# Patient Record
Sex: MALE | Race: WHITE | Employment: FULL TIME | URBAN - METROPOLITAN AREA
[De-identification: names, ages, dates, MRNs, and addresses within clinical notes are randomized per-mention and may not be internally consistent; named-entity substitution may affect disease eponyms.]

---

## 2017-09-25 ENCOUNTER — HOSPITAL ENCOUNTER (EMERGENCY)
Age: 52
Discharge: HOME | End: 2017-09-25
Attending: EMERGENCY MEDICINE

## 2017-09-25 ENCOUNTER — APPOINTMENT (OUTPATIENT)
Dept: GENERAL RADIOLOGY | Age: 52
End: 2017-09-25

## 2017-09-25 ENCOUNTER — APPOINTMENT (OUTPATIENT)
Dept: CT IMAGING | Age: 52
End: 2017-09-25

## 2017-09-25 ENCOUNTER — APPOINTMENT (OUTPATIENT)
Dept: ULTRASOUND IMAGING | Age: 52
End: 2017-09-25

## 2017-09-25 VITALS
TEMPERATURE: 98 F | RESPIRATION RATE: 16 BRPM | WEIGHT: 240 LBS | HEIGHT: 74 IN | OXYGEN SATURATION: 98 % | SYSTOLIC BLOOD PRESSURE: 145 MMHG | HEART RATE: 60 BPM | DIASTOLIC BLOOD PRESSURE: 96 MMHG | BODY MASS INDEX: 30.8 KG/M2

## 2017-09-25 DIAGNOSIS — S61.211A LACERATION OF LEFT INDEX FINGER: ICD-10-CM

## 2017-09-25 DIAGNOSIS — R52 PAIN: ICD-10-CM

## 2017-09-25 DIAGNOSIS — K40.90 UNILATERAL INGUINAL HERNIA WITHOUT OBSTRUCTION OR GANGRENE, RECURRENCE NOT SPECIFIED: ICD-10-CM

## 2017-09-25 DIAGNOSIS — S66.802A INJURY OF FLEXOR TENDON OF HAND, LEFT, INITIAL ENCOUNTER: Primary | ICD-10-CM

## 2017-09-25 LAB
ABSOLUTE EOS #: 0.1 K/UL (ref 0–0.4)
ABSOLUTE LYMPH #: 1.5 K/UL (ref 1–4.8)
ABSOLUTE MONO #: 0.6 K/UL (ref 0.1–1.3)
ALBUMIN SERPL-MCNC: 4.3 G/DL (ref 3.5–5.2)
ALBUMIN/GLOBULIN RATIO: ABNORMAL (ref 1–2.5)
ALP BLD-CCNC: 56 U/L (ref 40–129)
ALT SERPL-CCNC: 23 U/L (ref 5–41)
ANION GAP SERPL CALCULATED.3IONS-SCNC: 14 MMOL/L (ref 9–17)
AST SERPL-CCNC: 18 U/L
BASOPHILS # BLD: 1 %
BASOPHILS ABSOLUTE: 0 K/UL (ref 0–0.2)
BILIRUB SERPL-MCNC: 0.41 MG/DL (ref 0.3–1.2)
BILIRUBIN URINE: NEGATIVE
BUN BLDV-MCNC: 21 MG/DL (ref 6–20)
BUN/CREAT BLD: ABNORMAL (ref 9–20)
CALCIUM SERPL-MCNC: 8.6 MG/DL (ref 8.6–10.4)
CHLORIDE BLD-SCNC: 101 MMOL/L (ref 98–107)
CO2: 22 MMOL/L (ref 20–31)
COLOR: YELLOW
COMMENT UA: NORMAL
CREAT SERPL-MCNC: 0.79 MG/DL (ref 0.7–1.2)
DIFFERENTIAL TYPE: NORMAL
EOSINOPHILS RELATIVE PERCENT: 2 %
GFR AFRICAN AMERICAN: >60 ML/MIN
GFR NON-AFRICAN AMERICAN: >60 ML/MIN
GFR SERPL CREATININE-BSD FRML MDRD: ABNORMAL ML/MIN/{1.73_M2}
GFR SERPL CREATININE-BSD FRML MDRD: ABNORMAL ML/MIN/{1.73_M2}
GLUCOSE BLD-MCNC: 107 MG/DL (ref 70–99)
GLUCOSE URINE: NEGATIVE
HCT VFR BLD CALC: 44.1 % (ref 41–53)
HEMOGLOBIN: 15.7 G/DL (ref 13.5–17.5)
KETONES, URINE: NEGATIVE
LEUKOCYTE ESTERASE, URINE: NEGATIVE
LIPASE: 40 U/L (ref 13–60)
LYMPHOCYTES # BLD: 19 %
MCH RBC QN AUTO: 33.6 PG (ref 26–34)
MCHC RBC AUTO-ENTMCNC: 35.6 G/DL (ref 31–37)
MCV RBC AUTO: 94.4 FL (ref 80–100)
MONOCYTES # BLD: 8 %
NITRITE, URINE: NEGATIVE
PDW BLD-RTO: 12.4 % (ref 11.5–14.9)
PH UA: 5.5 (ref 5–8)
PLATELET # BLD: 191 K/UL (ref 150–450)
PLATELET ESTIMATE: NORMAL
PMV BLD AUTO: 7.5 FL (ref 6–12)
POTASSIUM SERPL-SCNC: 4.2 MMOL/L (ref 3.7–5.3)
PROTEIN UA: NEGATIVE
RBC # BLD: 4.67 M/UL (ref 4.5–5.9)
RBC # BLD: NORMAL 10*6/UL
SEG NEUTROPHILS: 70 %
SEGMENTED NEUTROPHILS ABSOLUTE COUNT: 5.8 K/UL (ref 1.3–9.1)
SODIUM BLD-SCNC: 137 MMOL/L (ref 135–144)
SPECIFIC GRAVITY UA: 1.02 (ref 1–1.03)
TOTAL PROTEIN: 6.9 G/DL (ref 6.4–8.3)
TURBIDITY: CLEAR
URINE HGB: NEGATIVE
UROBILINOGEN, URINE: NORMAL
WBC # BLD: 8.1 K/UL (ref 3.5–11)
WBC # BLD: NORMAL 10*3/UL

## 2017-09-25 PROCEDURE — 6360000002 HC RX W HCPCS: Performed by: EMERGENCY MEDICINE

## 2017-09-25 PROCEDURE — 90471 IMMUNIZATION ADMIN: CPT | Performed by: EMERGENCY MEDICINE

## 2017-09-25 PROCEDURE — 83690 ASSAY OF LIPASE: CPT

## 2017-09-25 PROCEDURE — 2580000003 HC RX 258: Performed by: EMERGENCY MEDICINE

## 2017-09-25 PROCEDURE — 73130 X-RAY EXAM OF HAND: CPT

## 2017-09-25 PROCEDURE — 85025 COMPLETE CBC W/AUTO DIFF WBC: CPT

## 2017-09-25 PROCEDURE — 80053 COMPREHEN METABOLIC PANEL: CPT

## 2017-09-25 PROCEDURE — 93976 VASCULAR STUDY: CPT

## 2017-09-25 PROCEDURE — 36415 COLL VENOUS BLD VENIPUNCTURE: CPT

## 2017-09-25 PROCEDURE — 74177 CT ABD & PELVIS W/CONTRAST: CPT

## 2017-09-25 PROCEDURE — 90715 TDAP VACCINE 7 YRS/> IM: CPT | Performed by: EMERGENCY MEDICINE

## 2017-09-25 PROCEDURE — 6360000004 HC RX CONTRAST MEDICATION: Performed by: EMERGENCY MEDICINE

## 2017-09-25 PROCEDURE — 99284 EMERGENCY DEPT VISIT MOD MDM: CPT

## 2017-09-25 PROCEDURE — 76870 US EXAM SCROTUM: CPT

## 2017-09-25 PROCEDURE — 81003 URINALYSIS AUTO W/O SCOPE: CPT

## 2017-09-25 RX ORDER — HYDROCODONE BITARTRATE AND ACETAMINOPHEN 5; 325 MG/1; MG/1
1 TABLET ORAL EVERY 6 HOURS PRN
Qty: 15 TABLET | Refills: 0 | Status: SHIPPED | OUTPATIENT
Start: 2017-09-25 | End: 2017-10-02

## 2017-09-25 RX ORDER — SODIUM CHLORIDE 0.9 % (FLUSH) 0.9 %
10 SYRINGE (ML) INJECTION PRN
Status: DISCONTINUED | OUTPATIENT
Start: 2017-09-25 | End: 2017-09-25 | Stop reason: HOSPADM

## 2017-09-25 RX ORDER — 0.9 % SODIUM CHLORIDE 0.9 %
100 INTRAVENOUS SOLUTION INTRAVENOUS ONCE
Status: COMPLETED | OUTPATIENT
Start: 2017-09-25 | End: 2017-09-25

## 2017-09-25 RX ADMIN — Medication 10 ML: at 09:08

## 2017-09-25 RX ADMIN — IOPAMIDOL 100 ML: 755 INJECTION, SOLUTION INTRAVENOUS at 09:08

## 2017-09-25 RX ADMIN — TETANUS TOXOID, REDUCED DIPHTHERIA TOXOID AND ACELLULAR PERTUSSIS VACCINE, ADSORBED 0.5 ML: 5; 2.5; 8; 8; 2.5 SUSPENSION INTRAMUSCULAR at 08:08

## 2017-09-25 RX ADMIN — SODIUM CHLORIDE 100 ML: 9 INJECTION, SOLUTION INTRAVENOUS at 09:08

## 2017-09-25 ASSESSMENT — PAIN DESCRIPTION - ORIENTATION: ORIENTATION: RIGHT

## 2017-09-25 ASSESSMENT — PAIN DESCRIPTION - DESCRIPTORS: DESCRIPTORS: SHARP

## 2017-09-25 ASSESSMENT — PAIN DESCRIPTION - PAIN TYPE: TYPE: ACUTE PAIN

## 2017-09-25 ASSESSMENT — PAIN DESCRIPTION - LOCATION: LOCATION: GROIN

## 2017-09-25 ASSESSMENT — PAIN SCALES - GENERAL: PAINLEVEL_OUTOF10: 7

## 2018-11-15 ENCOUNTER — OFFICE VISIT (OUTPATIENT)
Dept: FAMILY MEDICINE CLINIC | Age: 53
End: 2018-11-15
Payer: COMMERCIAL

## 2018-11-15 VITALS
DIASTOLIC BLOOD PRESSURE: 90 MMHG | WEIGHT: 240.4 LBS | SYSTOLIC BLOOD PRESSURE: 142 MMHG | BODY MASS INDEX: 30.85 KG/M2 | TEMPERATURE: 97.1 F | HEIGHT: 74 IN | HEART RATE: 67 BPM

## 2018-11-15 DIAGNOSIS — Z23 NEED FOR INFLUENZA VACCINATION: ICD-10-CM

## 2018-11-15 DIAGNOSIS — Z13.220 SCREENING FOR HYPERLIPIDEMIA: ICD-10-CM

## 2018-11-15 DIAGNOSIS — Z12.5 SCREENING FOR PROSTATE CANCER: ICD-10-CM

## 2018-11-15 DIAGNOSIS — K40.90 INGUINAL HERNIA, RIGHT: ICD-10-CM

## 2018-11-15 DIAGNOSIS — E66.09 CLASS 1 OBESITY DUE TO EXCESS CALORIES WITH SERIOUS COMORBIDITY AND BODY MASS INDEX (BMI) OF 30.0 TO 30.9 IN ADULT: ICD-10-CM

## 2018-11-15 DIAGNOSIS — Z76.89 ENCOUNTER TO ESTABLISH CARE: ICD-10-CM

## 2018-11-15 DIAGNOSIS — Z80.0 FAMILY HISTORY OF COLON CANCER: ICD-10-CM

## 2018-11-15 DIAGNOSIS — Z12.11 SCREENING FOR COLON CANCER: ICD-10-CM

## 2018-11-15 DIAGNOSIS — I10 ESSENTIAL HYPERTENSION: Primary | ICD-10-CM

## 2018-11-15 PROBLEM — E66.811 CLASS 1 OBESITY DUE TO EXCESS CALORIES WITH SERIOUS COMORBIDITY AND BODY MASS INDEX (BMI) OF 30.0 TO 30.9 IN ADULT: Status: ACTIVE | Noted: 2018-11-15

## 2018-11-15 PROCEDURE — 4004F PT TOBACCO SCREEN RCVD TLK: CPT | Performed by: NURSE PRACTITIONER

## 2018-11-15 PROCEDURE — 3017F COLORECTAL CA SCREEN DOC REV: CPT | Performed by: NURSE PRACTITIONER

## 2018-11-15 PROCEDURE — 90688 IIV4 VACCINE SPLT 0.5 ML IM: CPT | Performed by: NURSE PRACTITIONER

## 2018-11-15 PROCEDURE — 90471 IMMUNIZATION ADMIN: CPT | Performed by: NURSE PRACTITIONER

## 2018-11-15 PROCEDURE — G8482 FLU IMMUNIZE ORDER/ADMIN: HCPCS | Performed by: NURSE PRACTITIONER

## 2018-11-15 PROCEDURE — G8427 DOCREV CUR MEDS BY ELIG CLIN: HCPCS | Performed by: NURSE PRACTITIONER

## 2018-11-15 PROCEDURE — G8417 CALC BMI ABV UP PARAM F/U: HCPCS | Performed by: NURSE PRACTITIONER

## 2018-11-15 PROCEDURE — 99203 OFFICE O/P NEW LOW 30 MIN: CPT | Performed by: NURSE PRACTITIONER

## 2018-11-15 RX ORDER — HYDROCHLOROTHIAZIDE 12.5 MG/1
12.5 TABLET ORAL DAILY
Qty: 90 TABLET | Refills: 0 | Status: SHIPPED | OUTPATIENT
Start: 2018-11-15 | End: 2019-01-31 | Stop reason: SDUPTHER

## 2018-11-15 ASSESSMENT — PATIENT HEALTH QUESTIONNAIRE - PHQ9
2. FEELING DOWN, DEPRESSED OR HOPELESS: 0
SUM OF ALL RESPONSES TO PHQ9 QUESTIONS 1 & 2: 0
SUM OF ALL RESPONSES TO PHQ QUESTIONS 1-9: 0
SUM OF ALL RESPONSES TO PHQ QUESTIONS 1-9: 0
1. LITTLE INTEREST OR PLEASURE IN DOING THINGS: 0

## 2018-11-15 NOTE — PROGRESS NOTES
heard.  Pulmonary/Chest: Effort normal and breath sounds normal. No respiratory distress. Musculoskeletal: Normal range of motion. Neurological: He is alert and oriented to person, place, and time. Skin: Skin is warm and dry. Psychiatric: He has a normal mood and affect. His behavior is normal. Judgment and thought content normal.   Vitals reviewed. Assessment:      1. Essential hypertension    2. Class 1 obesity due to excess calories with serious comorbidity and body mass index (BMI) of 30.0 to 30.9 in adult    3. Screening for prostate cancer    4. Encounter to establish care    5. Screening for hyperlipidemia    6. Inguinal hernia, right    7. Screening for colon cancer    8. Family history of colon cancer    9. Need for influenza vaccination                     Plan:      Orders Placed This Encounter   Procedures    INFLUENZA, QUADV, 3 YRS AND OLDER, IM, MDV, 0.5ML (FLUZONE QUADV)    CBC With Auto Differential     Standing Status:   Future     Standing Expiration Date:   11/15/2019    Comprehensive Metabolic Panel     Standing Status:   Future     Standing Expiration Date:   11/15/2019    Lipid Panel     Standing Status:   Future     Standing Expiration Date:   11/15/2019     Order Specific Question:   Is Patient Fasting?/# of Hours     Answer:   15    PSA Screening     Standing Status:   Future     Standing Expiration Date:   11/15/2019   Zeke Parker MD, Gastroeneterology [place]     Referral Priority:   Routine     Referral Type:   Consult for Advice and Opinion     Referral Reason:   Specialty Services Required     Referred to Provider:   Suze Johnson MD     Requested Specialty:   Gastroenterology     Number of Visits Requested:   1     1. Essential hypertension    - CBC With Auto Differential; Future  - Comprehensive Metabolic Panel; Future  - aspirin 81 MG tablet; Take 81 mg by mouth daily  - hydrochlorothiazide (HYDRODIURIL) 12.5 MG tablet;  Take 1 tablet by mouth daily

## 2018-11-16 ASSESSMENT — ENCOUNTER SYMPTOMS
COUGH: 0
SHORTNESS OF BREATH: 0
RESPIRATORY NEGATIVE: 1
WHEEZING: 0

## 2019-01-22 ENCOUNTER — HOSPITAL ENCOUNTER (OUTPATIENT)
Age: 54
Setting detail: SPECIMEN
Discharge: HOME | End: 2019-01-22
Payer: COMMERCIAL

## 2019-01-22 ENCOUNTER — NURSE ONLY (OUTPATIENT)
Dept: FAMILY MEDICINE CLINIC | Age: 54
End: 2019-01-22
Payer: COMMERCIAL

## 2019-01-22 VITALS — SYSTOLIC BLOOD PRESSURE: 136 MMHG | DIASTOLIC BLOOD PRESSURE: 86 MMHG

## 2019-01-22 DIAGNOSIS — I10 ESSENTIAL HYPERTENSION: ICD-10-CM

## 2019-01-22 DIAGNOSIS — Z12.5 SCREENING FOR PROSTATE CANCER: ICD-10-CM

## 2019-01-22 DIAGNOSIS — E66.09 CLASS 1 OBESITY DUE TO EXCESS CALORIES WITH SERIOUS COMORBIDITY AND BODY MASS INDEX (BMI) OF 30.0 TO 30.9 IN ADULT: ICD-10-CM

## 2019-01-22 DIAGNOSIS — Z13.220 SCREENING FOR HYPERLIPIDEMIA: ICD-10-CM

## 2019-01-22 LAB
ABSOLUTE EOS #: 0.1 K/UL (ref 0–0.44)
ABSOLUTE IMMATURE GRANULOCYTE: 0.03 K/UL (ref 0–0.3)
ABSOLUTE LYMPH #: 1.99 K/UL (ref 1.1–3.7)
ABSOLUTE MONO #: 0.68 K/UL (ref 0.1–1.2)
ALBUMIN SERPL-MCNC: 4.7 G/DL (ref 3.5–5.2)
ALBUMIN/GLOBULIN RATIO: 1.7 (ref 1–2.5)
ALP BLD-CCNC: 47 U/L (ref 40–129)
ALT SERPL-CCNC: 31 U/L (ref 5–41)
ANION GAP SERPL CALCULATED.3IONS-SCNC: 14 MMOL/L (ref 9–17)
AST SERPL-CCNC: 21 U/L
BASOPHILS # BLD: 1 % (ref 0–2)
BASOPHILS ABSOLUTE: 0.06 K/UL (ref 0–0.2)
BILIRUB SERPL-MCNC: 0.64 MG/DL (ref 0.3–1.2)
BUN BLDV-MCNC: 23 MG/DL (ref 6–20)
BUN/CREAT BLD: ABNORMAL (ref 9–20)
CALCIUM SERPL-MCNC: 9.7 MG/DL (ref 8.6–10.4)
CHLORIDE BLD-SCNC: 97 MMOL/L (ref 98–107)
CHOLESTEROL/HDL RATIO: 4.1
CHOLESTEROL: 193 MG/DL
CO2: 27 MMOL/L (ref 20–31)
CREAT SERPL-MCNC: 0.88 MG/DL (ref 0.7–1.2)
DIFFERENTIAL TYPE: ABNORMAL
EOSINOPHILS RELATIVE PERCENT: 1 % (ref 1–4)
GFR AFRICAN AMERICAN: >60 ML/MIN
GFR NON-AFRICAN AMERICAN: >60 ML/MIN
GFR SERPL CREATININE-BSD FRML MDRD: ABNORMAL ML/MIN/{1.73_M2}
GFR SERPL CREATININE-BSD FRML MDRD: ABNORMAL ML/MIN/{1.73_M2}
GLUCOSE BLD-MCNC: 100 MG/DL (ref 70–99)
HCT VFR BLD CALC: 45.6 % (ref 40.7–50.3)
HDLC SERPL-MCNC: 47 MG/DL
HEMOGLOBIN: 16.1 G/DL (ref 13–17)
IMMATURE GRANULOCYTES: 0 %
LDL CHOLESTEROL: 116 MG/DL (ref 0–130)
LYMPHOCYTES # BLD: 21 % (ref 24–43)
MCH RBC QN AUTO: 32.7 PG (ref 25.2–33.5)
MCHC RBC AUTO-ENTMCNC: 35.3 G/DL (ref 28.4–34.8)
MCV RBC AUTO: 92.5 FL (ref 82.6–102.9)
MONOCYTES # BLD: 7 % (ref 3–12)
NRBC AUTOMATED: 0 PER 100 WBC
PDW BLD-RTO: 11.9 % (ref 11.8–14.4)
PLATELET # BLD: 217 K/UL (ref 138–453)
PLATELET ESTIMATE: ABNORMAL
PMV BLD AUTO: 9.6 FL (ref 8.1–13.5)
POTASSIUM SERPL-SCNC: 4.5 MMOL/L (ref 3.7–5.3)
PROSTATE SPECIFIC ANTIGEN: 1.03 UG/L
RBC # BLD: 4.93 M/UL (ref 4.21–5.77)
RBC # BLD: ABNORMAL 10*6/UL
SEG NEUTROPHILS: 70 % (ref 36–65)
SEGMENTED NEUTROPHILS ABSOLUTE COUNT: 6.73 K/UL (ref 1.5–8.1)
SODIUM BLD-SCNC: 138 MMOL/L (ref 135–144)
TOTAL PROTEIN: 7.4 G/DL (ref 6.4–8.3)
TRIGL SERPL-MCNC: 150 MG/DL
VLDLC SERPL CALC-MCNC: ABNORMAL MG/DL (ref 1–30)
WBC # BLD: 9.6 K/UL (ref 3.5–11.3)
WBC # BLD: ABNORMAL 10*3/UL

## 2019-01-22 PROCEDURE — 99211 OFF/OP EST MAY X REQ PHY/QHP: CPT | Performed by: NURSE PRACTITIONER

## 2019-01-23 ENCOUNTER — TELEPHONE (OUTPATIENT)
Dept: GASTROENTEROLOGY | Age: 54
End: 2019-01-23

## 2019-01-24 ENCOUNTER — TELEPHONE (OUTPATIENT)
Dept: GASTROENTEROLOGY | Age: 54
End: 2019-01-24

## 2019-01-24 DIAGNOSIS — Z12.11 SCREENING FOR COLON CANCER: Primary | ICD-10-CM

## 2019-01-25 RX ORDER — POLYETHYLENE GLYCOL 3350 17 G/17G
POWDER, FOR SOLUTION ORAL
Qty: 255 G | Refills: 0 | Status: ON HOLD | OUTPATIENT
Start: 2019-01-25 | End: 2019-01-29 | Stop reason: ALTCHOICE

## 2019-01-29 ENCOUNTER — HOSPITAL ENCOUNTER (OUTPATIENT)
Age: 54
Setting detail: OUTPATIENT SURGERY
Discharge: HOME | End: 2019-01-29
Attending: INTERNAL MEDICINE | Admitting: INTERNAL MEDICINE
Payer: COMMERCIAL

## 2019-01-29 VITALS
RESPIRATION RATE: 16 BRPM | TEMPERATURE: 97.5 F | HEART RATE: 70 BPM | BODY MASS INDEX: 29.52 KG/M2 | HEIGHT: 74 IN | OXYGEN SATURATION: 98 % | SYSTOLIC BLOOD PRESSURE: 140 MMHG | DIASTOLIC BLOOD PRESSURE: 78 MMHG | WEIGHT: 230 LBS

## 2019-01-29 PROCEDURE — 2580000003 HC RX 258: Performed by: INTERNAL MEDICINE

## 2019-01-29 PROCEDURE — 99153 MOD SED SAME PHYS/QHP EA: CPT | Performed by: INTERNAL MEDICINE

## 2019-01-29 PROCEDURE — 7100000011 HC PHASE II RECOVERY - ADDTL 15 MIN: Performed by: INTERNAL MEDICINE

## 2019-01-29 PROCEDURE — 3609010300 HC COLONOSCOPY W/BIOPSY SINGLE/MULTIPLE: Performed by: INTERNAL MEDICINE

## 2019-01-29 PROCEDURE — 2709999900 HC NON-CHARGEABLE SUPPLY: Performed by: INTERNAL MEDICINE

## 2019-01-29 PROCEDURE — 88305 TISSUE EXAM BY PATHOLOGIST: CPT

## 2019-01-29 PROCEDURE — 6360000002 HC RX W HCPCS: Performed by: INTERNAL MEDICINE

## 2019-01-29 PROCEDURE — 7100000010 HC PHASE II RECOVERY - FIRST 15 MIN: Performed by: INTERNAL MEDICINE

## 2019-01-29 PROCEDURE — 99152 MOD SED SAME PHYS/QHP 5/>YRS: CPT | Performed by: INTERNAL MEDICINE

## 2019-01-29 RX ORDER — FENTANYL CITRATE 50 UG/ML
INJECTION, SOLUTION INTRAMUSCULAR; INTRAVENOUS PRN
Status: DISCONTINUED | OUTPATIENT
Start: 2019-01-29 | End: 2019-01-29 | Stop reason: HOSPADM

## 2019-01-29 RX ORDER — SODIUM CHLORIDE 9 MG/ML
INJECTION, SOLUTION INTRAVENOUS CONTINUOUS
Status: DISCONTINUED | OUTPATIENT
Start: 2019-01-29 | End: 2019-01-29 | Stop reason: HOSPADM

## 2019-01-29 RX ORDER — MIDAZOLAM HYDROCHLORIDE 1 MG/ML
INJECTION INTRAMUSCULAR; INTRAVENOUS PRN
Status: DISCONTINUED | OUTPATIENT
Start: 2019-01-29 | End: 2019-01-29 | Stop reason: HOSPADM

## 2019-01-29 RX ADMIN — SODIUM CHLORIDE: 9 INJECTION, SOLUTION INTRAVENOUS at 07:30

## 2019-01-29 ASSESSMENT — PAIN - FUNCTIONAL ASSESSMENT: PAIN_FUNCTIONAL_ASSESSMENT: 0-10

## 2019-01-29 ASSESSMENT — PAIN SCALES - GENERAL: PAINLEVEL_OUTOF10: 0

## 2019-01-30 LAB — SURGICAL PATHOLOGY REPORT: NORMAL

## 2019-01-31 ENCOUNTER — OFFICE VISIT (OUTPATIENT)
Dept: FAMILY MEDICINE CLINIC | Age: 54
End: 2019-01-31
Payer: COMMERCIAL

## 2019-01-31 VITALS
OXYGEN SATURATION: 98 % | TEMPERATURE: 97.7 F | DIASTOLIC BLOOD PRESSURE: 80 MMHG | HEART RATE: 77 BPM | SYSTOLIC BLOOD PRESSURE: 122 MMHG | WEIGHT: 247 LBS | BODY MASS INDEX: 31.71 KG/M2

## 2019-01-31 DIAGNOSIS — I10 ESSENTIAL HYPERTENSION: Primary | ICD-10-CM

## 2019-01-31 DIAGNOSIS — K57.30 DIVERTICULOSIS OF LARGE INTESTINE WITHOUT HEMORRHAGE: ICD-10-CM

## 2019-01-31 DIAGNOSIS — K45.8 RECURRENT ABDOMINAL HERNIA WITHOUT OBSTRUCTION OR GANGRENE, UNSPECIFIED HERNIA TYPE: ICD-10-CM

## 2019-01-31 DIAGNOSIS — Z23 NEED FOR PNEUMOCOCCAL VACCINATION: ICD-10-CM

## 2019-01-31 PROCEDURE — 3017F COLORECTAL CA SCREEN DOC REV: CPT | Performed by: NURSE PRACTITIONER

## 2019-01-31 PROCEDURE — 4004F PT TOBACCO SCREEN RCVD TLK: CPT | Performed by: NURSE PRACTITIONER

## 2019-01-31 PROCEDURE — G8427 DOCREV CUR MEDS BY ELIG CLIN: HCPCS | Performed by: NURSE PRACTITIONER

## 2019-01-31 PROCEDURE — 90732 PPSV23 VACC 2 YRS+ SUBQ/IM: CPT | Performed by: NURSE PRACTITIONER

## 2019-01-31 PROCEDURE — G8417 CALC BMI ABV UP PARAM F/U: HCPCS | Performed by: NURSE PRACTITIONER

## 2019-01-31 PROCEDURE — G8482 FLU IMMUNIZE ORDER/ADMIN: HCPCS | Performed by: NURSE PRACTITIONER

## 2019-01-31 PROCEDURE — 90471 IMMUNIZATION ADMIN: CPT | Performed by: NURSE PRACTITIONER

## 2019-01-31 PROCEDURE — 99214 OFFICE O/P EST MOD 30 MIN: CPT | Performed by: NURSE PRACTITIONER

## 2019-01-31 RX ORDER — HYDROCHLOROTHIAZIDE 12.5 MG/1
12.5 TABLET ORAL DAILY
Qty: 90 TABLET | Refills: 1 | Status: SHIPPED | OUTPATIENT
Start: 2019-01-31 | End: 2019-09-18 | Stop reason: SDUPTHER

## 2019-01-31 ASSESSMENT — ENCOUNTER SYMPTOMS
WHEEZING: 0
COUGH: 0
RESPIRATORY NEGATIVE: 1
SHORTNESS OF BREATH: 0

## 2019-02-26 ENCOUNTER — TELEPHONE (OUTPATIENT)
Dept: GASTROENTEROLOGY | Age: 54
End: 2019-02-26

## 2019-02-28 ENCOUNTER — HOSPITAL ENCOUNTER (OUTPATIENT)
Dept: PREADMISSION TESTING | Age: 54
Discharge: HOME | End: 2019-03-04
Payer: COMMERCIAL

## 2019-02-28 VITALS
SYSTOLIC BLOOD PRESSURE: 133 MMHG | TEMPERATURE: 97.3 F | WEIGHT: 240 LBS | OXYGEN SATURATION: 99 % | BODY MASS INDEX: 30.8 KG/M2 | HEART RATE: 64 BPM | RESPIRATION RATE: 16 BRPM | DIASTOLIC BLOOD PRESSURE: 90 MMHG | HEIGHT: 74 IN

## 2019-02-28 LAB
ABSOLUTE EOS #: 0.1 K/UL (ref 0–0.4)
ABSOLUTE IMMATURE GRANULOCYTE: NORMAL K/UL (ref 0–0.3)
ABSOLUTE LYMPH #: 2.3 K/UL (ref 1–4.8)
ABSOLUTE MONO #: 0.5 K/UL (ref 0.1–1.3)
ANION GAP SERPL CALCULATED.3IONS-SCNC: 10 MMOL/L (ref 9–17)
BASOPHILS # BLD: 1 % (ref 0–2)
BASOPHILS ABSOLUTE: 0.1 K/UL (ref 0–0.2)
BUN BLDV-MCNC: 19 MG/DL (ref 6–20)
BUN/CREAT BLD: ABNORMAL (ref 9–20)
CALCIUM SERPL-MCNC: 9.5 MG/DL (ref 8.6–10.4)
CHLORIDE BLD-SCNC: 97 MMOL/L (ref 98–107)
CO2: 30 MMOL/L (ref 20–31)
CREAT SERPL-MCNC: 1.01 MG/DL (ref 0.7–1.2)
DIFFERENTIAL TYPE: NORMAL
EOSINOPHILS RELATIVE PERCENT: 2 % (ref 0–4)
GFR AFRICAN AMERICAN: >60 ML/MIN
GFR NON-AFRICAN AMERICAN: >60 ML/MIN
GFR SERPL CREATININE-BSD FRML MDRD: ABNORMAL ML/MIN/{1.73_M2}
GFR SERPL CREATININE-BSD FRML MDRD: ABNORMAL ML/MIN/{1.73_M2}
GLUCOSE BLD-MCNC: 105 MG/DL (ref 70–99)
HCT VFR BLD CALC: 45.4 % (ref 41–53)
HEMOGLOBIN: 16.1 G/DL (ref 13.5–17.5)
IMMATURE GRANULOCYTES: NORMAL %
LYMPHOCYTES # BLD: 27 % (ref 24–44)
MCH RBC QN AUTO: 33.2 PG (ref 26–34)
MCHC RBC AUTO-ENTMCNC: 35.5 G/DL (ref 31–37)
MCV RBC AUTO: 93.6 FL (ref 80–100)
MONOCYTES # BLD: 6 % (ref 1–7)
NRBC AUTOMATED: NORMAL PER 100 WBC
PDW BLD-RTO: 12.4 % (ref 11.5–14.9)
PLATELET # BLD: 207 K/UL (ref 150–450)
PLATELET ESTIMATE: NORMAL
PMV BLD AUTO: 7.8 FL (ref 6–12)
POTASSIUM SERPL-SCNC: 3.7 MMOL/L (ref 3.7–5.3)
RBC # BLD: 4.86 M/UL (ref 4.5–5.9)
RBC # BLD: NORMAL 10*6/UL
SEG NEUTROPHILS: 64 % (ref 36–66)
SEGMENTED NEUTROPHILS ABSOLUTE COUNT: 5.6 K/UL (ref 1.3–9.1)
SODIUM BLD-SCNC: 137 MMOL/L (ref 135–144)
WBC # BLD: 8.6 K/UL (ref 3.5–11)
WBC # BLD: NORMAL 10*3/UL

## 2019-02-28 PROCEDURE — 36415 COLL VENOUS BLD VENIPUNCTURE: CPT

## 2019-02-28 PROCEDURE — 80048 BASIC METABOLIC PNL TOTAL CA: CPT

## 2019-02-28 PROCEDURE — 85025 COMPLETE CBC W/AUTO DIFF WBC: CPT

## 2019-02-28 PROCEDURE — 93005 ELECTROCARDIOGRAM TRACING: CPT

## 2019-02-28 ASSESSMENT — ENCOUNTER SYMPTOMS
ABDOMINAL PAIN: 0
EYE REDNESS: 0
SORE THROAT: 0
DIARRHEA: 0
SINUS PRESSURE: 0
ABDOMINAL DISTENTION: 0
EYE ITCHING: 0
PHOTOPHOBIA: 0
APNEA: 0
COLOR CHANGE: 0
EYES NEGATIVE: 1
BACK PAIN: 0
COUGH: 0
SINUS PAIN: 0
CONSTIPATION: 0

## 2019-02-28 ASSESSMENT — PAIN DESCRIPTION - PAIN TYPE: TYPE: CHRONIC PAIN

## 2019-02-28 ASSESSMENT — PAIN SCALES - GENERAL: PAINLEVEL_OUTOF10: 7

## 2019-02-28 ASSESSMENT — PAIN DESCRIPTION - DESCRIPTORS: DESCRIPTORS: BURNING;OTHER (COMMENT)

## 2019-02-28 ASSESSMENT — PAIN DESCRIPTION - PROGRESSION: CLINICAL_PROGRESSION: GRADUALLY WORSENING

## 2019-02-28 ASSESSMENT — PAIN DESCRIPTION - LOCATION: LOCATION: GROIN

## 2019-02-28 ASSESSMENT — PAIN DESCRIPTION - FREQUENCY: FREQUENCY: CONTINUOUS

## 2019-02-28 ASSESSMENT — PAIN DESCRIPTION - ONSET: ONSET: ON-GOING

## 2019-02-28 ASSESSMENT — PAIN DESCRIPTION - ORIENTATION: ORIENTATION: RIGHT

## 2019-03-01 LAB
EKG ATRIAL RATE: 62 BPM
EKG P AXIS: 60 DEGREES
EKG P-R INTERVAL: 196 MS
EKG Q-T INTERVAL: 416 MS
EKG QRS DURATION: 98 MS
EKG QTC CALCULATION (BAZETT): 422 MS
EKG R AXIS: 19 DEGREES
EKG T AXIS: 59 DEGREES
EKG VENTRICULAR RATE: 62 BPM

## 2019-03-07 ENCOUNTER — OFFICE VISIT (OUTPATIENT)
Dept: FAMILY MEDICINE CLINIC | Age: 54
End: 2019-03-07
Payer: COMMERCIAL

## 2019-03-07 VITALS
WEIGHT: 248 LBS | OXYGEN SATURATION: 96 % | SYSTOLIC BLOOD PRESSURE: 124 MMHG | DIASTOLIC BLOOD PRESSURE: 80 MMHG | BODY MASS INDEX: 31.84 KG/M2 | TEMPERATURE: 97.1 F | HEART RATE: 62 BPM

## 2019-03-07 DIAGNOSIS — Z13.1 SCREENING FOR DIABETES MELLITUS: Primary | ICD-10-CM

## 2019-03-07 LAB — HBA1C MFR BLD: 5.5 %

## 2019-03-07 PROCEDURE — G8482 FLU IMMUNIZE ORDER/ADMIN: HCPCS | Performed by: NURSE PRACTITIONER

## 2019-03-07 PROCEDURE — 99242 OFF/OP CONSLTJ NEW/EST SF 20: CPT | Performed by: NURSE PRACTITIONER

## 2019-03-07 PROCEDURE — 83036 HEMOGLOBIN GLYCOSYLATED A1C: CPT | Performed by: NURSE PRACTITIONER

## 2019-03-13 ENCOUNTER — ANESTHESIA (OUTPATIENT)
Dept: OPERATING ROOM | Age: 54
End: 2019-03-13
Payer: COMMERCIAL

## 2019-03-13 ENCOUNTER — HOSPITAL ENCOUNTER (OUTPATIENT)
Age: 54
Setting detail: OUTPATIENT SURGERY
Discharge: HOME | End: 2019-03-13
Attending: SURGERY | Admitting: SURGERY
Payer: COMMERCIAL

## 2019-03-13 ENCOUNTER — ANESTHESIA EVENT (OUTPATIENT)
Dept: OPERATING ROOM | Age: 54
End: 2019-03-13
Payer: COMMERCIAL

## 2019-03-13 VITALS
DIASTOLIC BLOOD PRESSURE: 96 MMHG | WEIGHT: 240 LBS | TEMPERATURE: 97 F | BODY MASS INDEX: 30.8 KG/M2 | HEART RATE: 60 BPM | RESPIRATION RATE: 13 BRPM | HEIGHT: 74 IN | SYSTOLIC BLOOD PRESSURE: 159 MMHG | OXYGEN SATURATION: 97 %

## 2019-03-13 VITALS
TEMPERATURE: 96.1 F | SYSTOLIC BLOOD PRESSURE: 138 MMHG | DIASTOLIC BLOOD PRESSURE: 89 MMHG | OXYGEN SATURATION: 100 % | RESPIRATION RATE: 7 BRPM

## 2019-03-13 DIAGNOSIS — K40.90 RIGHT INGUINAL HERNIA: Primary | ICD-10-CM

## 2019-03-13 PROCEDURE — 7100000000 HC PACU RECOVERY - FIRST 15 MIN: Performed by: SURGERY

## 2019-03-13 PROCEDURE — 2720000010 HC SURG SUPPLY STERILE: Performed by: SURGERY

## 2019-03-13 PROCEDURE — 7100000031 HC ASPR PHASE II RECOVERY - ADDTL 15 MIN: Performed by: SURGERY

## 2019-03-13 PROCEDURE — 3600000012 HC SURGERY LEVEL 2 ADDTL 15MIN: Performed by: SURGERY

## 2019-03-13 PROCEDURE — 2580000003 HC RX 258: Performed by: ANESTHESIOLOGY

## 2019-03-13 PROCEDURE — 2500000003 HC RX 250 WO HCPCS: Performed by: SURGERY

## 2019-03-13 PROCEDURE — 6360000002 HC RX W HCPCS: Performed by: SURGERY

## 2019-03-13 PROCEDURE — 3600000002 HC SURGERY LEVEL 2 BASE: Performed by: SURGERY

## 2019-03-13 PROCEDURE — 7100000030 HC ASPR PHASE II RECOVERY - FIRST 15 MIN: Performed by: SURGERY

## 2019-03-13 PROCEDURE — 2709999900 HC NON-CHARGEABLE SUPPLY: Performed by: SURGERY

## 2019-03-13 PROCEDURE — 7100000001 HC PACU RECOVERY - ADDTL 15 MIN: Performed by: SURGERY

## 2019-03-13 PROCEDURE — 6360000002 HC RX W HCPCS: Performed by: NURSE ANESTHETIST, CERTIFIED REGISTERED

## 2019-03-13 PROCEDURE — 3700000001 HC ADD 15 MINUTES (ANESTHESIA): Performed by: SURGERY

## 2019-03-13 PROCEDURE — 88302 TISSUE EXAM BY PATHOLOGIST: CPT

## 2019-03-13 PROCEDURE — 3700000000 HC ANESTHESIA ATTENDED CARE: Performed by: SURGERY

## 2019-03-13 PROCEDURE — 6370000000 HC RX 637 (ALT 250 FOR IP): Performed by: ANESTHESIOLOGY

## 2019-03-13 PROCEDURE — 2500000003 HC RX 250 WO HCPCS: Performed by: NURSE ANESTHETIST, CERTIFIED REGISTERED

## 2019-03-13 PROCEDURE — C1781 MESH (IMPLANTABLE): HCPCS | Performed by: SURGERY

## 2019-03-13 DEVICE — PLUG HERN XL W1.6XL2IN INGUINAL POLYPR REP PRESHAPED ONLAY: Type: IMPLANTABLE DEVICE | Site: INGUINAL | Status: FUNCTIONAL

## 2019-03-13 RX ORDER — MIDAZOLAM HYDROCHLORIDE 1 MG/ML
INJECTION INTRAMUSCULAR; INTRAVENOUS PRN
Status: DISCONTINUED | OUTPATIENT
Start: 2019-03-13 | End: 2019-03-13 | Stop reason: SDUPTHER

## 2019-03-13 RX ORDER — DEXAMETHASONE SODIUM PHOSPHATE 4 MG/ML
INJECTION, SOLUTION INTRA-ARTICULAR; INTRALESIONAL; INTRAMUSCULAR; INTRAVENOUS; SOFT TISSUE PRN
Status: DISCONTINUED | OUTPATIENT
Start: 2019-03-13 | End: 2019-03-13 | Stop reason: SDUPTHER

## 2019-03-13 RX ORDER — GLYCOPYRROLATE 1 MG/5 ML
SYRINGE (ML) INTRAVENOUS PRN
Status: DISCONTINUED | OUTPATIENT
Start: 2019-03-13 | End: 2019-03-13 | Stop reason: SDUPTHER

## 2019-03-13 RX ORDER — ONDANSETRON 2 MG/ML
INJECTION INTRAMUSCULAR; INTRAVENOUS PRN
Status: DISCONTINUED | OUTPATIENT
Start: 2019-03-13 | End: 2019-03-13 | Stop reason: SDUPTHER

## 2019-03-13 RX ORDER — ONDANSETRON 4 MG/1
TABLET, FILM COATED ORAL
Qty: 20 TABLET | Refills: 0 | Status: SHIPPED | OUTPATIENT
Start: 2019-03-13 | End: 2019-04-03 | Stop reason: ALTCHOICE

## 2019-03-13 RX ORDER — CEPHALEXIN 500 MG/1
CAPSULE ORAL
Qty: 21 CAPSULE | Refills: 0 | Status: SHIPPED | OUTPATIENT
Start: 2019-03-13 | End: 2019-04-03 | Stop reason: ALTCHOICE

## 2019-03-13 RX ORDER — FENTANYL CITRATE 50 UG/ML
INJECTION, SOLUTION INTRAMUSCULAR; INTRAVENOUS PRN
Status: DISCONTINUED | OUTPATIENT
Start: 2019-03-13 | End: 2019-03-13 | Stop reason: SDUPTHER

## 2019-03-13 RX ORDER — PROPOFOL 10 MG/ML
INJECTION, EMULSION INTRAVENOUS PRN
Status: DISCONTINUED | OUTPATIENT
Start: 2019-03-13 | End: 2019-03-13 | Stop reason: SDUPTHER

## 2019-03-13 RX ORDER — ROCURONIUM BROMIDE 10 MG/ML
INJECTION, SOLUTION INTRAVENOUS PRN
Status: DISCONTINUED | OUTPATIENT
Start: 2019-03-13 | End: 2019-03-13 | Stop reason: SDUPTHER

## 2019-03-13 RX ORDER — OXYCODONE HYDROCHLORIDE AND ACETAMINOPHEN 5; 325 MG/1; MG/1
1 TABLET ORAL ONCE
Status: COMPLETED | OUTPATIENT
Start: 2019-03-13 | End: 2019-03-13

## 2019-03-13 RX ORDER — SODIUM CHLORIDE, SODIUM LACTATE, POTASSIUM CHLORIDE, CALCIUM CHLORIDE 600; 310; 30; 20 MG/100ML; MG/100ML; MG/100ML; MG/100ML
INJECTION, SOLUTION INTRAVENOUS CONTINUOUS
Status: DISCONTINUED | OUTPATIENT
Start: 2019-03-13 | End: 2019-03-13 | Stop reason: HOSPADM

## 2019-03-13 RX ORDER — NEOSTIGMINE METHYLSULFATE 5 MG/5 ML
SYRINGE (ML) INTRAVENOUS PRN
Status: DISCONTINUED | OUTPATIENT
Start: 2019-03-13 | End: 2019-03-13 | Stop reason: SDUPTHER

## 2019-03-13 RX ORDER — OXYCODONE HYDROCHLORIDE AND ACETAMINOPHEN 5; 325 MG/1; MG/1
1 TABLET ORAL EVERY 6 HOURS PRN
Qty: 28 TABLET | Refills: 0 | Status: SHIPPED | OUTPATIENT
Start: 2019-03-13 | End: 2019-03-20

## 2019-03-13 RX ORDER — BUPIVACAINE HYDROCHLORIDE 5 MG/ML
INJECTION, SOLUTION EPIDURAL; INTRACAUDAL PRN
Status: DISCONTINUED | OUTPATIENT
Start: 2019-03-13 | End: 2019-03-13 | Stop reason: ALTCHOICE

## 2019-03-13 RX ORDER — LIDOCAINE HYDROCHLORIDE 10 MG/ML
INJECTION, SOLUTION EPIDURAL; INFILTRATION; INTRACAUDAL; PERINEURAL PRN
Status: DISCONTINUED | OUTPATIENT
Start: 2019-03-13 | End: 2019-03-13 | Stop reason: SDUPTHER

## 2019-03-13 RX ADMIN — ONDANSETRON 4 MG: 2 INJECTION INTRAMUSCULAR; INTRAVENOUS at 08:25

## 2019-03-13 RX ADMIN — FENTANYL CITRATE 100 MCG: 50 INJECTION, SOLUTION INTRAMUSCULAR; INTRAVENOUS at 07:11

## 2019-03-13 RX ADMIN — DEXAMETHASONE SODIUM PHOSPHATE 4 MG: 4 INJECTION, SOLUTION INTRAMUSCULAR; INTRAVENOUS at 08:25

## 2019-03-13 RX ADMIN — Medication 0.6 MG: at 08:29

## 2019-03-13 RX ADMIN — ROCURONIUM BROMIDE 50 MG: 10 INJECTION INTRAVENOUS at 07:12

## 2019-03-13 RX ADMIN — Medication 3 MG: at 08:29

## 2019-03-13 RX ADMIN — OXYCODONE AND ACETAMINOPHEN 1 TABLET: 5; 325 TABLET ORAL at 09:46

## 2019-03-13 RX ADMIN — SODIUM CHLORIDE, POTASSIUM CHLORIDE, SODIUM LACTATE AND CALCIUM CHLORIDE: 600; 310; 30; 20 INJECTION, SOLUTION INTRAVENOUS at 06:22

## 2019-03-13 RX ADMIN — Medication 2 G: at 07:05

## 2019-03-13 RX ADMIN — SODIUM CHLORIDE, POTASSIUM CHLORIDE, SODIUM LACTATE AND CALCIUM CHLORIDE: 600; 310; 30; 20 INJECTION, SOLUTION INTRAVENOUS at 08:08

## 2019-03-13 RX ADMIN — PROPOFOL 200 MG: 10 INJECTION, EMULSION INTRAVENOUS at 07:11

## 2019-03-13 RX ADMIN — ROCURONIUM BROMIDE 10 MG: 10 INJECTION INTRAVENOUS at 07:18

## 2019-03-13 RX ADMIN — LIDOCAINE HYDROCHLORIDE 50 MG: 10 INJECTION, SOLUTION EPIDURAL; INFILTRATION; INTRACAUDAL; PERINEURAL at 07:11

## 2019-03-13 RX ADMIN — MIDAZOLAM 2 MG: 1 INJECTION INTRAMUSCULAR; INTRAVENOUS at 07:05

## 2019-03-13 RX ADMIN — ROCURONIUM BROMIDE 10 MG: 10 INJECTION INTRAVENOUS at 07:56

## 2019-03-13 ASSESSMENT — PULMONARY FUNCTION TESTS
PIF_VALUE: 17
PIF_VALUE: 16
PIF_VALUE: 15
PIF_VALUE: 17
PIF_VALUE: 15
PIF_VALUE: 16
PIF_VALUE: 15
PIF_VALUE: 3
PIF_VALUE: 15
PIF_VALUE: 17
PIF_VALUE: 14
PIF_VALUE: 15
PIF_VALUE: 16
PIF_VALUE: 17
PIF_VALUE: 15
PIF_VALUE: 16
PIF_VALUE: 1
PIF_VALUE: 15
PIF_VALUE: 16
PIF_VALUE: 35
PIF_VALUE: 15
PIF_VALUE: 16
PIF_VALUE: 15
PIF_VALUE: 16
PIF_VALUE: 16
PIF_VALUE: 15
PIF_VALUE: 15
PIF_VALUE: 17
PIF_VALUE: 16
PIF_VALUE: 15
PIF_VALUE: 16
PIF_VALUE: 15
PIF_VALUE: 17
PIF_VALUE: 0
PIF_VALUE: 1
PIF_VALUE: 15
PIF_VALUE: 15
PIF_VALUE: 16
PIF_VALUE: 15
PIF_VALUE: 19
PIF_VALUE: 16
PIF_VALUE: 17
PIF_VALUE: 15
PIF_VALUE: 17
PIF_VALUE: 15
PIF_VALUE: 15
PIF_VALUE: 12
PIF_VALUE: 3
PIF_VALUE: 15
PIF_VALUE: 16
PIF_VALUE: 15
PIF_VALUE: 16
PIF_VALUE: 15
PIF_VALUE: 14
PIF_VALUE: 16
PIF_VALUE: 16
PIF_VALUE: 14
PIF_VALUE: 15
PIF_VALUE: 1
PIF_VALUE: 15
PIF_VALUE: 2
PIF_VALUE: 16
PIF_VALUE: 16
PIF_VALUE: 18
PIF_VALUE: 3
PIF_VALUE: 16
PIF_VALUE: 15
PIF_VALUE: 18
PIF_VALUE: 15
PIF_VALUE: 16
PIF_VALUE: 18
PIF_VALUE: 3
PIF_VALUE: 16
PIF_VALUE: 15
PIF_VALUE: 15
PIF_VALUE: 16
PIF_VALUE: 15
PIF_VALUE: 14
PIF_VALUE: 16
PIF_VALUE: 1
PIF_VALUE: 2
PIF_VALUE: 17
PIF_VALUE: 31
PIF_VALUE: 16
PIF_VALUE: 15
PIF_VALUE: 16
PIF_VALUE: 17
PIF_VALUE: 16

## 2019-03-13 ASSESSMENT — PAIN SCALES - GENERAL
PAINLEVEL_OUTOF10: 0
PAINLEVEL_OUTOF10: 3
PAINLEVEL_OUTOF10: 5
PAINLEVEL_OUTOF10: 0
PAINLEVEL_OUTOF10: 0
PAINLEVEL_OUTOF10: 2
PAINLEVEL_OUTOF10: 5
PAINLEVEL_OUTOF10: 0

## 2019-03-13 ASSESSMENT — PAIN DESCRIPTION - ORIENTATION: ORIENTATION: RIGHT

## 2019-03-13 ASSESSMENT — PAIN - FUNCTIONAL ASSESSMENT: PAIN_FUNCTIONAL_ASSESSMENT: 0-10

## 2019-03-13 ASSESSMENT — PAIN DESCRIPTION - PAIN TYPE: TYPE: SURGICAL PAIN

## 2019-03-13 ASSESSMENT — PAIN DESCRIPTION - LOCATION: LOCATION: GROIN

## 2019-03-13 ASSESSMENT — PAIN DESCRIPTION - DESCRIPTORS: DESCRIPTORS: DISCOMFORT

## 2019-03-14 LAB — SURGICAL PATHOLOGY REPORT: NORMAL

## 2019-04-03 ENCOUNTER — OFFICE VISIT (OUTPATIENT)
Dept: FAMILY MEDICINE CLINIC | Age: 54
End: 2019-04-03
Payer: COMMERCIAL

## 2019-04-03 VITALS
BODY MASS INDEX: 31.71 KG/M2 | DIASTOLIC BLOOD PRESSURE: 82 MMHG | TEMPERATURE: 98 F | OXYGEN SATURATION: 98 % | WEIGHT: 247 LBS | HEART RATE: 70 BPM | SYSTOLIC BLOOD PRESSURE: 132 MMHG

## 2019-04-03 DIAGNOSIS — I10 ESSENTIAL HYPERTENSION: Primary | ICD-10-CM

## 2019-04-03 DIAGNOSIS — Z87.19 STATUS POST RIGHT INGUINAL HERNIA REPAIR: ICD-10-CM

## 2019-04-03 DIAGNOSIS — Z98.890 STATUS POST RIGHT INGUINAL HERNIA REPAIR: ICD-10-CM

## 2019-04-03 PROBLEM — K40.90 INGUINAL HERNIA, RIGHT: Status: RESOLVED | Noted: 2018-11-15 | Resolved: 2019-04-03

## 2019-04-03 PROCEDURE — 99213 OFFICE O/P EST LOW 20 MIN: CPT | Performed by: NURSE PRACTITIONER

## 2019-04-03 ASSESSMENT — PATIENT HEALTH QUESTIONNAIRE - PHQ9
SUM OF ALL RESPONSES TO PHQ QUESTIONS 1-9: 0
1. LITTLE INTEREST OR PLEASURE IN DOING THINGS: 0
SUM OF ALL RESPONSES TO PHQ9 QUESTIONS 1 & 2: 0
2. FEELING DOWN, DEPRESSED OR HOPELESS: 0
SUM OF ALL RESPONSES TO PHQ QUESTIONS 1-9: 0

## 2019-04-03 ASSESSMENT — ENCOUNTER SYMPTOMS
RESPIRATORY NEGATIVE: 1
SHORTNESS OF BREATH: 0
WHEEZING: 0
COUGH: 0

## 2019-04-03 NOTE — PROGRESS NOTES
Social History     Tobacco Use    Smoking status: Current Every Day Smoker     Packs/day: 0.25     Years: 45.00     Pack years: 11.25     Types: Cigarettes    Smokeless tobacco: Former User   Substance Use Topics    Alcohol use: Yes     Comment: occassional ETOH use         Current Outpatient Medications   Medication Sig Dispense Refill    hydrochlorothiazide (HYDRODIURIL) 12.5 MG tablet Take 1 tablet by mouth daily 90 tablet 1     No current facility-administered medications for this visit. Allergies   Allergen Reactions    Penicillins Itching and Swelling       Health Maintenance   Topic Date Due    Shingles Vaccine (1 of 2) 05/15/2019 (Originally 2/3/2016)    Potassium monitoring  02/28/2020    Creatinine monitoring  02/28/2020    Diabetes screen  03/07/2022    Lipid screen  01/22/2024    Colon cancer screen colonoscopy  01/29/2024    DTaP/Tdap/Td vaccine (2 - Td) 09/25/2027    Flu vaccine  Completed    Pneumococcal 0-64 years at Risk Vaccine  Completed    HIV screen  Discontinued          Review of Systems   Constitutional: Negative. Negative for fatigue. HENT: Negative. Respiratory: Negative. Negative for cough, shortness of breath and wheezing. Cardiovascular: Negative. Negative for chest pain and palpitations. Musculoskeletal: Negative. Negative for arthralgias. Skin: Negative. Negative for pallor and rash. Neurological: Negative for syncope and headaches. Hematological: Negative. Psychiatric/Behavioral: Negative. Negative for sleep disturbance. The patient is not nervous/anxious. Objective:     /82 (Site: Left Upper Arm, Position: Sitting, Cuff Size: Large Adult)   Pulse 70   Temp 98 °F (36.7 °C) (Oral)   Wt 247 lb (112 kg)   SpO2 98%   BMI 31.71 kg/m²   Physical Exam   Constitutional: He is oriented to person, place, and time. Vital signs are normal. He appears well-developed and well-nourished. HENT:   Head: Normocephalic and atraumatic. Cardiovascular: Normal rate, regular rhythm and normal heart sounds. Pulmonary/Chest: Effort normal and breath sounds normal.   Musculoskeletal: Normal range of motion. Neurological: He is alert and oriented to person, place, and time. Skin: Skin is warm. Psychiatric: He has a normal mood and affect. His behavior is normal. Judgment and thought content normal.   Vitals reviewed. Assessment:      1. Essential hypertension    2. Status post right inguinal hernia repair                     Plan:      No orders of the defined types were placed in this encounter. 3 week post op, clear to drive. HTN controlled  No follow-ups on file. Patient given educational materials - see patientinstructions. Discussed use, benefit, and side effects of prescribed medications. All patient questions answered. Pt voiced understanding. Reviewed health maintenance. Instructed to continue current medications, diet and exercise. Patient agreedwith treatment plan. Follow up as directed.      Electronically signed by PREMA Wall CNP on 4/3/2019

## 2019-04-03 NOTE — PATIENT INSTRUCTIONS
Patient Education        Stopping Smoking: Care Instructions  Your Care Instructions  Cigarette smokers crave the nicotine in cigarettes. Giving it up is much harder than simply changing a habit. Your body has to stop craving the nicotine. It is hard to quit, but you can do it. There are many tools that people use to quit smoking. You may find that combining tools works best for you. There are several steps to quitting. First you get ready to quit. Then you get support to help you. After that, you learn new skills and behaviors to become a nonsmoker. For many people, a necessary step is getting and using medicine. Your doctor will help you set up the plan that best meets your needs. You may want to attend a smoking cessation program to help you quit smoking. When you choose a program, look for one that has proven success. Ask your doctor for ideas. You will greatly increase your chances of success if you take medicine as well as get counseling or join a cessation program.  Some of the changes you feel when you first quit tobacco are uncomfortable. Your body will miss the nicotine at first, and you may feel short-tempered and grumpy. You may have trouble sleeping or concentrating. Medicine can help you deal with these symptoms. You may struggle with changing your smoking habits and rituals. The last step is the tricky one: Be prepared for the smoking urge to continue for a time. This is a lot to deal with, but keep at it. You will feel better. Follow-up care is a key part of your treatment and safety. Be sure to make and go to all appointments, and call your doctor if you are having problems. It's also a good idea to know your test results and keep a list of the medicines you take. How can you care for yourself at home? · Ask your family, friends, and coworkers for support. You have a better chance of quitting if you have help and support.   · Join a support group, such as Nicotine Anonymous, for people who are trying to quit smoking. · Consider signing up for a smoking cessation program, such as the American Lung Association's Freedom from Smoking program.  · Get text messaging support. Go to the website at www.smokefree. gov to sign up for the  program.  · Set a quit date. Pick your date carefully so that it is not right in the middle of a big deadline or stressful time. Once you quit, do not even take a puff. Get rid of all ashtrays and lighters after your last cigarette. Clean your house and your clothes so that they do not smell of smoke. · Learn how to be a nonsmoker. Think about ways you can avoid those things that make you reach for a cigarette. ? Avoid situations that put you at greatest risk for smoking. For some people, it is hard to have a drink with friends without smoking. For others, they might skip a coffee break with coworkers who smoke. ? Change your daily routine. Take a different route to work or eat a meal in a different place. · Cut down on stress. Calm yourself or release tension by doing an activity you enjoy, such as reading a book, taking a hot bath, or gardening. · Talk to your doctor or pharmacist about nicotine replacement therapy, which replaces the nicotine in your body. You still get nicotine but you do not use tobacco. Nicotine replacement products help you slowly reduce the amount of nicotine you need. These products come in several forms, many of them available over-the-counter:  ? Nicotine patches  ? Nicotine gum and lozenges  ? Nicotine inhaler  · Ask your doctor about bupropion (Wellbutrin) or varenicline (Chantix), which are prescription medicines. They do not contain nicotine. They help you by reducing withdrawal symptoms, such as stress and anxiety. · Some people find hypnosis, acupuncture, and massage helpful for ending the smoking habit. · Eat a healthy diet and get regular exercise.  Having healthy habits will help your body move past its craving for nicotine. · Be prepared to keep trying. Most people are not successful the first few times they try to quit. Do not get mad at yourself if you smoke again. Make a list of things you learned and think about when you want to try again, such as next week, next month, or next year. Where can you learn more? Go to https://[employer]penoahewthelma.siXis. org and sign in to your [email or web address] account. Enter G447 in the Innov[employer] box to learn more about \"Stopping Smoking: Care Instructions. \"     If you do not have an account, please click on the \"Sign Up Now\" link. Current as of: September 26, 2018  Content Version: 11.9  © 5063-2613 [employer]. Care instructions adapted under license by [facility] ([facility]). If you have questions about a medical condition or this instruction, always ask your healthcare professional. [address] any warranty or liability for your use of this information.

## 2019-04-03 NOTE — PROGRESS NOTES
Patient I spresent for a hospital f/u for a hernia repair on 3/13/19. Patients pain level is a 0/10    Medications and pharmacy reviewed with patient. Visit Information    Have you changed or started any medications since your last visit including any over-the-counter medicines, vitamins, or herbal medicines? no   Have you stopped taking any of your medications? Is so, why? -  no  Are you having any side effects from any of your medications? - no    Have you seen any other physician or provider since your last visit? GI   Have you had any other diagnostic tests since your last visit?  no   Have you been seen in the emergency room and/or had an admission in a hospital since we last saw you?  yes - : 3/13/19: hernia repair   Have you had your routine dental cleaning in the past 6 months?  yes -      Do you have an active MyChart account? If no, what is the barrier?   Yes    Patient Care Team:  PREMA White - CNP as PCP - General (Family Nurse Practitioner)  Ignacio Meza MD as Consulting Physician (Gastroenterology)    Medical History Review  Past Medical, Family, and Social History reviewed and does contribute to the patient presenting condition    Health Maintenance   Topic Date Due    Shingles Vaccine (1 of 2) 05/15/2019 (Originally 2/3/2016)    Potassium monitoring  02/28/2020    Creatinine monitoring  02/28/2020    Diabetes screen  03/07/2022    Lipid screen  01/22/2024    Colon cancer screen colonoscopy  01/29/2024    DTaP/Tdap/Td vaccine (2 - Td) 09/25/2027    Flu vaccine  Completed    Pneumococcal 0-64 years at Risk Vaccine  Completed    HIV screen  Discontinued

## 2019-04-03 NOTE — LETTER
[address] [place] [id number]  Phone: [phone]  Fax: [phone]    PREMA Jansen CNP        April 3, 2019     Patient: Iveth Lopez   YOB: 1965   Date of Visit: 4/3/2019       To Whom It May Concern:     Lee Knox may return to full duty immediately with no restrictions. He does not have any driving restrictions. If you have any questions or concerns, please don't hesitate to call.     Sincerely,          PREMA Jansen CNP

## 2019-07-19 ENCOUNTER — TELEPHONE (OUTPATIENT)
Dept: FAMILY MEDICINE CLINIC | Age: 54
End: 2019-07-19

## 2019-07-19 NOTE — TELEPHONE ENCOUNTER
The pt called today, stated had sand in his since last Saturday. Stated:   Swelling and possible infection. Referred to:  Opthalmology/Optometry for eval/treat.   Just a [place]

## 2019-09-18 DIAGNOSIS — I10 ESSENTIAL HYPERTENSION: ICD-10-CM

## 2019-09-18 RX ORDER — HYDROCHLOROTHIAZIDE 12.5 MG/1
TABLET ORAL
Qty: 90 TABLET | Refills: 1 | Status: SHIPPED | OUTPATIENT
Start: 2019-09-18 | End: 2020-04-10

## 2020-04-10 RX ORDER — HYDROCHLOROTHIAZIDE 12.5 MG/1
TABLET ORAL
Qty: 90 TABLET | Refills: 0 | Status: SHIPPED | OUTPATIENT
Start: 2020-04-10 | End: 2020-09-22 | Stop reason: SDUPTHER

## 2020-04-10 NOTE — TELEPHONE ENCOUNTER
Last visit: 4/3/19  Last Med refill: 9/18/19  Does patient have enough medication for 72 hours: No:     PATIENT NEEDS APPT    Next Visit Date:  No future appointments.     Health Maintenance   Topic Date Due    Shingles Vaccine (1 of 2) 02/03/2016    Potassium monitoring  02/28/2020    Creatinine monitoring  02/28/2020    Flu vaccine (Season Ended) 09/01/2020    Lipid screen  01/22/2024    Colon cancer screen colonoscopy  01/29/2024    DTaP/Tdap/Td vaccine (2 - Td) 09/25/2027    Pneumococcal 0-64 years Vaccine  Completed    Hepatitis A vaccine  Aged Out    Hepatitis B vaccine  Aged Out    Hib vaccine  Aged Out    Meningococcal (ACWY) vaccine  Aged Out    HIV screen  Discontinued       Hemoglobin A1C (%)   Date Value   03/07/2019 5.5             ( goal A1C is < 7)   No results found for: LABMICR  LDL Cholesterol (mg/dL)   Date Value   01/22/2019 116       (goal LDL is <100)   AST (U/L)   Date Value   01/22/2019 21     ALT (U/L)   Date Value   01/22/2019 31     BUN (mg/dL)   Date Value   02/28/2019 19     BP Readings from Last 3 Encounters:   04/03/19 132/82   03/13/19 (!) 159/96   03/13/19 138/89          (goal 120/80)    All Future Testing planned in [facility]              Patient Active Problem List:     Essential hypertension     Class 1 obesity due to excess calories with serious comorbidity and body mass index (BMI) of 30.0 to 30.9 in adult     Diverticulosis of large intestine without hemorrhage     Status post right inguinal hernia repair

## 2020-06-04 RX ORDER — HYDROCHLOROTHIAZIDE 12.5 MG/1
TABLET ORAL
Qty: 90 TABLET | Refills: 0 | OUTPATIENT
Start: 2020-06-04

## 2020-09-22 RX ORDER — HYDROCHLOROTHIAZIDE 12.5 MG/1
TABLET ORAL
Qty: 30 TABLET | Refills: 0 | Status: SHIPPED | OUTPATIENT
Start: 2020-09-22 | End: 2020-10-26 | Stop reason: SDUPTHER

## 2020-10-26 ENCOUNTER — OFFICE VISIT (OUTPATIENT)
Dept: FAMILY MEDICINE CLINIC | Age: 55
End: 2020-10-26

## 2020-10-26 VITALS
SYSTOLIC BLOOD PRESSURE: 136 MMHG | WEIGHT: 253 LBS | HEIGHT: 74 IN | TEMPERATURE: 97.1 F | DIASTOLIC BLOOD PRESSURE: 84 MMHG | BODY MASS INDEX: 32.47 KG/M2 | HEART RATE: 61 BPM | OXYGEN SATURATION: 99 %

## 2020-10-26 PROCEDURE — 99213 OFFICE O/P EST LOW 20 MIN: CPT | Performed by: NURSE PRACTITIONER

## 2020-10-26 RX ORDER — LISINOPRIL 5 MG/1
5 TABLET ORAL DAILY
Qty: 90 TABLET | Refills: 1 | Status: SHIPPED | OUTPATIENT
Start: 2020-10-26 | End: 2021-04-21

## 2020-10-26 RX ORDER — HYDROCHLOROTHIAZIDE 12.5 MG/1
TABLET ORAL
Qty: 90 TABLET | Refills: 1 | Status: SHIPPED | OUTPATIENT
Start: 2020-10-26 | End: 2021-05-03 | Stop reason: SDUPTHER

## 2020-10-26 ASSESSMENT — PATIENT HEALTH QUESTIONNAIRE - PHQ9
SUM OF ALL RESPONSES TO PHQ QUESTIONS 1-9: 0
SUM OF ALL RESPONSES TO PHQ9 QUESTIONS 1 & 2: 0
2. FEELING DOWN, DEPRESSED OR HOPELESS: 0
1. LITTLE INTEREST OR PLEASURE IN DOING THINGS: 0

## 2020-10-26 ASSESSMENT — ENCOUNTER SYMPTOMS
COUGH: 0
SHORTNESS OF BREATH: 0

## 2020-10-26 NOTE — PROGRESS NOTES
[address], [address] [place] [address] Dr THAO  [phone]    Barb Wong is a 48 y.o. male who presents today for his  medical conditions/complaints as noted below. Barb Wong is c/o of [facility]  . HPI:     HPI     Hypertension:  Home blood pressure monitoring: No.  He is adherent to a low sodium diet. Patient denies chest pain, shortness of breath, headache, lightheadedness, blurred vision and peripheral edema. Antihypertensive medication side effects: no medication side effects noted. Use of agents associated with hypertension: none. Sodium (mmol/L)   Date Value   02/28/2019 137    BUN (mg/dL)   Date Value   02/28/2019 19    Glucose (mg/dL)   Date Value   02/28/2019 105 (H)      Potassium (mmol/L)   Date Value   02/28/2019 3.7    CREATININE (mg/dL)   Date Value   02/28/2019 1.01         Nursing note reviewed and discussed with patient. Patient's medications, allergies, past medical, surgical, social and family histories were reviewed and updated asappropriate. Current Outpatient Medications   Medication Sig Dispense Refill    lisinopril (PRINIVIL;ZESTRIL) 5 MG tablet Take 1 tablet by mouth daily 90 tablet 1    hydroCHLOROthiazide (HYDRODIURIL) 12.5 MG tablet TAKE ONE TABLET BY MOUTH DAILY 90 tablet 1     No current facility-administered medications for this visit.       Past Medical History:   Diagnosis Date    Diverticulosis     History of colonoscopy 01/29/2019    hyperplastic polyp, tubular adenoma    Hypertension     Inguinal hernia     right    Wears dentures     upper      Past Surgical History:   Procedure Laterality Date    COLONOSCOPY N/A 1/29/2019    hyperplastic polyp, tubular adenoma    HERNIA REPAIR Right 3/13/2019    HERNIA INGUINAL REPAIR WITH MESH performed by Pavan Sanchez MD at [address] in [place]    TONSILLECTOMY       Family History

## 2021-04-21 DIAGNOSIS — I10 ESSENTIAL HYPERTENSION: ICD-10-CM

## 2021-04-21 NOTE — TELEPHONE ENCOUNTER
Last visit: 10/26/20  Last Med refill: 10/26/20  Does patient have enough medication for 72 hours: Yes    Next Visit Date:  Future Appointments   Date Time Provider Prateek Cadet   5/3/2021  1:00 PM PREMA Tinsley CNP [facility] [address] Maintenance   Topic Date Due    Hepatitis C screen  Never done    Shingles Vaccine (1 of 2) Never done    Potassium monitoring  02/28/2020    Creatinine monitoring  02/28/2020    Flu vaccine (Season Ended) 10/26/2021 (Originally 9/1/2021)    COVID-19 Vaccine (2 - Moderna 2-dose series) 04/28/2021    Diabetes screen  03/07/2022    Lipid screen  01/22/2024    Colon cancer screen colonoscopy  01/29/2024    DTaP/Tdap/Td vaccine (2 - Td) 09/25/2027    Pneumococcal 0-64 years Vaccine  Completed    Hepatitis A vaccine  Aged Out    Hepatitis B vaccine  Aged Out    Hib vaccine  Aged Out    Meningococcal (ACWY) vaccine  Aged Out    HIV screen  Discontinued       Hemoglobin A1C (%)   Date Value   03/07/2019 5.5             ( goal A1C is < 7)   No results found for: LABMICR  LDL Cholesterol (mg/dL)   Date Value   01/22/2019 116       (goal LDL is <100)   AST (U/L)   Date Value   01/22/2019 21     ALT (U/L)   Date Value   01/22/2019 31     BUN (mg/dL)   Date Value   02/28/2019 19     BP Readings from Last 3 Encounters:   10/26/20 136/84   04/03/19 132/82   03/13/19 (!) 159/96          (goal 120/80)    All Future Testing planned in [facility]  Lab Frequency Next Occurrence   CBC Once 05/19/2021   Comprehensive Metabolic Panel Once 28/70/1037   Lipid, Fasting Once 05/19/2021   Psa screening Once 05/19/2021               Patient Active Problem List:     Essential hypertension     Class 1 obesity due to excess calories with serious comorbidity and body mass index (BMI) of 30.0 to 30.9 in adult     Diverticulosis of large intestine without hemorrhage     Status post right inguinal hernia repair

## 2021-04-23 RX ORDER — LISINOPRIL 5 MG/1
TABLET ORAL
Qty: 90 TABLET | Refills: 1 | Status: SHIPPED | OUTPATIENT
Start: 2021-04-23 | End: 2021-10-27

## 2021-04-27 DIAGNOSIS — I10 ESSENTIAL HYPERTENSION: ICD-10-CM

## 2021-04-27 RX ORDER — LISINOPRIL 5 MG/1
TABLET ORAL
Qty: 90 TABLET | Refills: 0 | OUTPATIENT
Start: 2021-04-27

## 2021-04-29 LAB
ALBUMIN SERPL-MCNC: 4.3 G/DL
ALP BLD-CCNC: 46 U/L
ALT SERPL-CCNC: 36 U/L
ANION GAP SERPL CALCULATED.3IONS-SCNC: NORMAL MMOL/L
AST SERPL-CCNC: 19 U/L
BASOPHILS ABSOLUTE: 53 /ΜL
BASOPHILS RELATIVE PERCENT: 0.7 %
BILIRUB SERPL-MCNC: 0.7 MG/DL (ref 0.1–1.4)
BUN BLDV-MCNC: 22 MG/DL
CALCIUM SERPL-MCNC: 9.3 MG/DL
CHLORIDE BLD-SCNC: 100 MMOL/L
CHOLESTEROL, FASTING: 183
CO2: 30 MMOL/L
CREAT SERPL-MCNC: 0.94 MG/DL
EOSINOPHILS ABSOLUTE: 143 /ΜL
EOSINOPHILS RELATIVE PERCENT: 1.9 %
GFR CALCULATED: 91
GLUCOSE BLD-MCNC: 97 MG/DL
HCT VFR BLD CALC: 42.4 % (ref 41–53)
HDLC SERPL-MCNC: 57 MG/DL (ref 35–70)
HEMOGLOBIN: 14.8 G/DL (ref 13.5–17.5)
LDL CHOLESTEROL CALCULATED: 103 MG/DL (ref 0–160)
LYMPHOCYTES ABSOLUTE: 1568 /ΜL
LYMPHOCYTES RELATIVE PERCENT: 20.9 %
MCH RBC QN AUTO: 33.2 PG
MCHC RBC AUTO-ENTMCNC: 34.9 G/DL
MCV RBC AUTO: 95.1 FL
MONOCYTES ABSOLUTE: 660 /ΜL
MONOCYTES RELATIVE PERCENT: 8.8 %
NEUTROPHILS ABSOLUTE: 5078 /ΜL
NEUTROPHILS RELATIVE PERCENT: 67.7 %
PLATELET # BLD: 186 K/ΜL
PMV BLD AUTO: 9.9 FL
POTASSIUM SERPL-SCNC: 4 MMOL/L
PROSTATE SPECIFIC ANTIGEN: 0.8 NG/ML
RBC # BLD: 4.46 10^6/ΜL
SODIUM BLD-SCNC: 139 MMOL/L
TOTAL PROTEIN: 6.8
TRIGLYCERIDE, FASTING: 134
WBC # BLD: 7.5 10^3/ML

## 2021-05-03 ENCOUNTER — OFFICE VISIT (OUTPATIENT)
Dept: FAMILY MEDICINE CLINIC | Age: 56
End: 2021-05-03

## 2021-05-03 VITALS
DIASTOLIC BLOOD PRESSURE: 90 MMHG | OXYGEN SATURATION: 96 % | WEIGHT: 242 LBS | HEART RATE: 75 BPM | SYSTOLIC BLOOD PRESSURE: 146 MMHG | BODY MASS INDEX: 31.07 KG/M2

## 2021-05-03 DIAGNOSIS — Z13.220 SCREENING FOR HYPERLIPIDEMIA: ICD-10-CM

## 2021-05-03 DIAGNOSIS — I10 ESSENTIAL HYPERTENSION: ICD-10-CM

## 2021-05-03 DIAGNOSIS — Z12.5 SCREENING FOR MALIGNANT NEOPLASM OF PROSTATE: ICD-10-CM

## 2021-05-03 PROBLEM — S12.100A CLOSED DISPLACED FRACTURE OF SECOND CERVICAL VERTEBRA (HCC): Status: ACTIVE | Noted: 2020-06-17

## 2021-05-03 PROCEDURE — 99213 OFFICE O/P EST LOW 20 MIN: CPT | Performed by: NURSE PRACTITIONER

## 2021-05-03 RX ORDER — HYDROCHLOROTHIAZIDE 12.5 MG/1
TABLET ORAL
Qty: 90 TABLET | Refills: 1 | Status: SHIPPED | OUTPATIENT
Start: 2021-05-03 | End: 2021-11-05 | Stop reason: SDUPTHER

## 2021-05-03 SDOH — ECONOMIC STABILITY: FOOD INSECURITY: WITHIN THE PAST 12 MONTHS, YOU WORRIED THAT YOUR FOOD WOULD RUN OUT BEFORE YOU GOT MONEY TO BUY MORE.: NEVER TRUE

## 2021-05-03 SDOH — ECONOMIC STABILITY: FOOD INSECURITY: WITHIN THE PAST 12 MONTHS, THE FOOD YOU BOUGHT JUST DIDN'T LAST AND YOU DIDN'T HAVE MONEY TO GET MORE.: NEVER TRUE

## 2021-05-03 ASSESSMENT — ENCOUNTER SYMPTOMS
COUGH: 0
SHORTNESS OF BREATH: 0

## 2021-05-03 ASSESSMENT — PATIENT HEALTH QUESTIONNAIRE - PHQ9
SUM OF ALL RESPONSES TO PHQ QUESTIONS 1-9: 0
SUM OF ALL RESPONSES TO PHQ QUESTIONS 1-9: 0
2. FEELING DOWN, DEPRESSED OR HOPELESS: 0
1. LITTLE INTEREST OR PLEASURE IN DOING THINGS: 0

## 2021-05-03 NOTE — PROGRESS NOTES
[address], [address] [place] [address] Dr THAO  [phone]    Virginie Hansen is a 55 y.o. male who presents today for his  medical conditions/complaints as noted below. Virginie Hansen is c/o of 6 Month Follow-Up and Hypertension    HPI:     HPI  Hypertension:  Home blood pressure monitoring: No.  He is adherent to a low sodium diet. Patient denies chest pain, shortness of breath, headache, lightheadedness, blurred vision and peripheral edema. Antihypertensive medication side effects: no medication side effects noted. Use of agents associated with hypertension: none. Ace was added at last visit. Sodium (mmol/L)   Date Value   04/29/2021 139    BUN (mg/dL)   Date Value   04/29/2021 22    Glucose (mg/dL)   Date Value   04/29/2021 97      Potassium (mmol/L)   Date Value   04/29/2021 4.0    CREATININE (no units)   Date Value   04/29/2021 0.94         Nursing note reviewed and discussed with patient. Patient's medications, allergies, past medical, surgical, social and family histories were reviewed and updated asappropriate. Current Outpatient Medications   Medication Sig Dispense Refill    hydroCHLOROthiazide (HYDRODIURIL) 12.5 MG tablet TAKE ONE TABLET BY MOUTH DAILY 90 tablet 1    lisinopril (PRINIVIL;ZESTRIL) 5 MG tablet TAKE ONE TABLET BY MOUTH DAILY 90 tablet 1     No current facility-administered medications for this visit.       Past Medical History:   Diagnosis Date    Diverticulosis     History of colonoscopy 01/29/2019    hyperplastic polyp, tubular adenoma    Hypertension     Inguinal hernia     right    Wears dentures     upper      Past Surgical History:   Procedure Laterality Date    COLONOSCOPY N/A 1/29/2019    hyperplastic polyp, tubular adenoma    HERNIA REPAIR Right 3/13/2019    HERNIA INGUINAL REPAIR WITH MESH performed by Yoana Hussein MD at [address] in [facility] maintenance. Instructed to continue current medications, diet andexercise. 1.  Hillary Pack received counseling on the following healthy behaviors: nutrition, exercise and medication adherence  2. Patient given educationalmaterials when available - see patient instructions when applicable  3. Discussed use, benefit, and side effects of prescribed medications. Barriersto medication compliance addressed. All patient questions answered. Pt voiced understanding. 4.  Reviewed prior labs and health maintenance when applicable. 5.  Continue current medications, diet and exercise. CompletedRefills   Requested Prescriptions     Signed Prescriptions Disp Refills    hydroCHLOROthiazide (HYDRODIURIL) 12.5 MG tablet 90 tablet 1     Sig: TAKE ONE TABLET BY MOUTH DAILY       This note was transcribed using dictation with Dragon services. Efforts were made to correct any errors but some words may be misinterpreted.     Electronically signed by Kelley Phillips CNP on 5/3/2021 at 1:13 PM

## 2021-06-07 ENCOUNTER — HOSPITAL ENCOUNTER (EMERGENCY)
Age: 56
Discharge: HOME | End: 2021-06-07
Attending: EMERGENCY MEDICINE
Payer: OTHER GOVERNMENT

## 2021-06-07 VITALS
HEART RATE: 56 BPM | HEIGHT: 74 IN | SYSTOLIC BLOOD PRESSURE: 133 MMHG | OXYGEN SATURATION: 98 % | TEMPERATURE: 97.9 F | BODY MASS INDEX: 28.88 KG/M2 | DIASTOLIC BLOOD PRESSURE: 85 MMHG | WEIGHT: 225 LBS | RESPIRATION RATE: 16 BRPM

## 2021-06-07 DIAGNOSIS — U07.1 COVID-19 VIRUS DETECTED: ICD-10-CM

## 2021-06-07 DIAGNOSIS — R11.2 NAUSEA VOMITING AND DIARRHEA: Primary | ICD-10-CM

## 2021-06-07 DIAGNOSIS — R19.7 NAUSEA VOMITING AND DIARRHEA: Primary | ICD-10-CM

## 2021-06-07 DIAGNOSIS — E87.6 HYPOKALEMIA: ICD-10-CM

## 2021-06-07 LAB
-: ABNORMAL
ABSOLUTE EOS #: 0.1 K/UL (ref 0–0.4)
ABSOLUTE IMMATURE GRANULOCYTE: ABNORMAL K/UL (ref 0–0.3)
ABSOLUTE LYMPH #: 0.8 K/UL (ref 1–4.8)
ABSOLUTE MONO #: 0.6 K/UL (ref 0.1–1.3)
ALBUMIN SERPL-MCNC: 4.3 G/DL (ref 3.5–5.2)
ALBUMIN/GLOBULIN RATIO: ABNORMAL (ref 1–2.5)
ALP BLD-CCNC: 49 U/L (ref 40–129)
ALT SERPL-CCNC: 21 U/L (ref 5–41)
AMORPHOUS: ABNORMAL
ANION GAP SERPL CALCULATED.3IONS-SCNC: 10 MMOL/L (ref 9–17)
AST SERPL-CCNC: 20 U/L
BACTERIA: ABNORMAL
BASOPHILS # BLD: 1 % (ref 0–2)
BASOPHILS ABSOLUTE: 0 K/UL (ref 0–0.2)
BILIRUB SERPL-MCNC: 0.74 MG/DL (ref 0.3–1.2)
BILIRUBIN URINE: ABNORMAL
BUN BLDV-MCNC: 22 MG/DL (ref 6–20)
BUN/CREAT BLD: ABNORMAL (ref 9–20)
CALCIUM SERPL-MCNC: 8.6 MG/DL (ref 8.6–10.4)
CASTS UA: ABNORMAL /LPF
CHLORIDE BLD-SCNC: 93 MMOL/L (ref 98–107)
CO2: 27 MMOL/L (ref 20–31)
COLOR: ABNORMAL
COMMENT UA: ABNORMAL
CREAT SERPL-MCNC: 0.76 MG/DL (ref 0.7–1.2)
CRYSTALS, UA: ABNORMAL /HPF
DIFFERENTIAL TYPE: ABNORMAL
EOSINOPHILS RELATIVE PERCENT: 2 % (ref 0–4)
EPITHELIAL CELLS UA: ABNORMAL /HPF
GFR AFRICAN AMERICAN: >60 ML/MIN
GFR NON-AFRICAN AMERICAN: >60 ML/MIN
GFR SERPL CREATININE-BSD FRML MDRD: ABNORMAL ML/MIN/{1.73_M2}
GFR SERPL CREATININE-BSD FRML MDRD: ABNORMAL ML/MIN/{1.73_M2}
GLUCOSE BLD-MCNC: 116 MG/DL (ref 70–99)
GLUCOSE URINE: NEGATIVE
HCT VFR BLD CALC: 44.2 % (ref 41–53)
HEMOGLOBIN: 15.5 G/DL (ref 13.5–17.5)
IMMATURE GRANULOCYTES: ABNORMAL %
INFLUENZA A: NEGATIVE
INFLUENZA B: NEGATIVE
KETONES, URINE: NEGATIVE
LEUKOCYTE ESTERASE, URINE: NEGATIVE
LIPASE: 30 U/L (ref 13–60)
LYMPHOCYTES # BLD: 19 % (ref 24–44)
MCH RBC QN AUTO: 33 PG (ref 26–34)
MCHC RBC AUTO-ENTMCNC: 35.1 G/DL (ref 31–37)
MCV RBC AUTO: 93.9 FL (ref 80–100)
MONOCYTES # BLD: 13 % (ref 1–7)
MUCUS: ABNORMAL
NITRITE, URINE: NEGATIVE
NRBC AUTOMATED: ABNORMAL PER 100 WBC
OTHER OBSERVATIONS UA: ABNORMAL
PDW BLD-RTO: 12.1 % (ref 11.5–14.9)
PH UA: 5.5 (ref 5–8)
PLATELET # BLD: 150 K/UL (ref 150–450)
PLATELET ESTIMATE: ABNORMAL
PMV BLD AUTO: 7.8 FL (ref 6–12)
POTASSIUM SERPL-SCNC: 3.3 MMOL/L (ref 3.7–5.3)
PROTEIN UA: NEGATIVE
RBC # BLD: 4.71 M/UL (ref 4.5–5.9)
RBC # BLD: ABNORMAL 10*6/UL
RBC UA: ABNORMAL /HPF
RENAL EPITHELIAL, UA: ABNORMAL /HPF
SARS-COV-2 RNA, RT PCR: DETECTED
SEG NEUTROPHILS: 65 % (ref 36–66)
SEGMENTED NEUTROPHILS ABSOLUTE COUNT: 2.8 K/UL (ref 1.3–9.1)
SODIUM BLD-SCNC: 130 MMOL/L (ref 135–144)
SOURCE: ABNORMAL
SPECIFIC GRAVITY UA: 1.02 (ref 1–1.03)
SPECIMEN DESCRIPTION: ABNORMAL
TOTAL PROTEIN: 6.9 G/DL (ref 6.4–8.3)
TRICHOMONAS: ABNORMAL
TURBIDITY: CLEAR
URINE HGB: NEGATIVE
UROBILINOGEN, URINE: NORMAL
WBC # BLD: 4.3 K/UL (ref 3.5–11)
WBC # BLD: ABNORMAL 10*3/UL
WBC UA: ABNORMAL /HPF
YEAST: ABNORMAL

## 2021-06-07 PROCEDURE — 83690 ASSAY OF LIPASE: CPT

## 2021-06-07 PROCEDURE — 80053 COMPREHEN METABOLIC PANEL: CPT

## 2021-06-07 PROCEDURE — 99285 EMERGENCY DEPT VISIT HI MDM: CPT

## 2021-06-07 PROCEDURE — 96365 THER/PROPH/DIAG IV INF INIT: CPT

## 2021-06-07 PROCEDURE — 81001 URINALYSIS AUTO W/SCOPE: CPT

## 2021-06-07 PROCEDURE — 85025 COMPLETE CBC W/AUTO DIFF WBC: CPT

## 2021-06-07 PROCEDURE — 87636 SARSCOV2 & INF A&B AMP PRB: CPT

## 2021-06-07 PROCEDURE — 96375 TX/PRO/DX INJ NEW DRUG ADDON: CPT

## 2021-06-07 PROCEDURE — 36415 COLL VENOUS BLD VENIPUNCTURE: CPT

## 2021-06-07 PROCEDURE — 2580000003 HC RX 258: Performed by: EMERGENCY MEDICINE

## 2021-06-07 PROCEDURE — 6360000002 HC RX W HCPCS: Performed by: EMERGENCY MEDICINE

## 2021-06-07 RX ORDER — ONDANSETRON 2 MG/ML
4 INJECTION INTRAMUSCULAR; INTRAVENOUS ONCE
Status: COMPLETED | OUTPATIENT
Start: 2021-06-07 | End: 2021-06-07

## 2021-06-07 RX ORDER — ONDANSETRON 4 MG/1
4 TABLET, ORALLY DISINTEGRATING ORAL EVERY 8 HOURS PRN
Qty: 20 TABLET | Refills: 0 | Status: SHIPPED | OUTPATIENT
Start: 2021-06-07

## 2021-06-07 RX ORDER — 0.9 % SODIUM CHLORIDE 0.9 %
2000 INTRAVENOUS SOLUTION INTRAVENOUS ONCE
Status: COMPLETED | OUTPATIENT
Start: 2021-06-07 | End: 2021-06-07

## 2021-06-07 RX ORDER — POTASSIUM CHLORIDE 7.45 MG/ML
10 INJECTION INTRAVENOUS ONCE
Status: COMPLETED | OUTPATIENT
Start: 2021-06-07 | End: 2021-06-07

## 2021-06-07 RX ADMIN — POTASSIUM CHLORIDE 10 MEQ: 7.46 INJECTION, SOLUTION INTRAVENOUS at 08:45

## 2021-06-07 RX ADMIN — SODIUM CHLORIDE 2000 ML: 9 INJECTION, SOLUTION INTRAVENOUS at 07:48

## 2021-06-07 RX ADMIN — ONDANSETRON 4 MG: 2 INJECTION INTRAMUSCULAR; INTRAVENOUS at 07:56

## 2021-06-07 ASSESSMENT — PAIN DESCRIPTION - PAIN TYPE: TYPE: ACUTE PAIN

## 2021-06-07 ASSESSMENT — PAIN DESCRIPTION - PROGRESSION: CLINICAL_PROGRESSION: NOT CHANGED

## 2021-06-07 ASSESSMENT — ENCOUNTER SYMPTOMS
BACK PAIN: 0
DIARRHEA: 1
NAUSEA: 1
TROUBLE SWALLOWING: 0
BLOOD IN STOOL: 0
ABDOMINAL PAIN: 0
COUGH: 0
VOMITING: 1
SHORTNESS OF BREATH: 0
COLOR CHANGE: 0
SORE THROAT: 0
CONSTIPATION: 0

## 2021-06-07 ASSESSMENT — PAIN DESCRIPTION - DESCRIPTORS: DESCRIPTORS: ACHING

## 2021-06-07 ASSESSMENT — PAIN DESCRIPTION - LOCATION: LOCATION: FLANK

## 2021-06-07 ASSESSMENT — PAIN DESCRIPTION - ONSET: ONSET: PROGRESSIVE

## 2021-06-07 ASSESSMENT — PAIN SCALES - GENERAL: PAINLEVEL_OUTOF10: 5

## 2021-06-07 ASSESSMENT — PAIN DESCRIPTION - FREQUENCY: FREQUENCY: CONTINUOUS

## 2021-06-07 NOTE — ED PROVIDER NOTES
Breath sounds: Normal breath sounds. Abdominal:      General: Bowel sounds are normal. There is no distension. Palpations: Abdomen is soft. Tenderness: There is no abdominal tenderness. Musculoskeletal:         General: No tenderness. Cervical back: Neck supple. Lymphadenopathy:      Cervical: No cervical adenopathy. Skin:     General: Skin is warm and dry. Findings: No rash. Neurological:      Mental Status: He is alert and oriented to person, place, and time. Psychiatric:         Judgment: Judgment normal.           DIFFERENTIAL DIAGNOSIS/MDM:   44-year-old male presents with nausea, vomiting and diarrhea since Friday. He is afebrile, nontoxic, normal vital signs. No acute distress. No abdominal tenderness on my exam.    Suspect gastroenteritis, dehydration, metabolic abnormality. Not complaining of any abdominal pain right now and has no tenderness on exam.  We will hold off on imaging at this time. We will get lab work, give IV fluids, antiemetics.     DIAGNOSTIC RESULTS     EKG: All EKG's are interpreted by the Emergency Department Physician who either signs or Co-signs this chart in the absence of a cardiologist.        RADIOLOGY:   I directly visualized the following  images and reviewed the radiologist interpretations:  No orders to display           ED BEDSIDE ULTRASOUND:      LABS:  Labs Reviewed   COVID-19 & INFLUENZA COMBO - Abnormal; Notable for the following components:       Result Value    SARS-CoV-2 RNA, RT PCR DETECTED (*)     All other components within normal limits   CBC WITH AUTO DIFFERENTIAL - Abnormal; Notable for the following components:    Lymphocytes 19 (*)     Monocytes 13 (*)     Absolute Lymph # 0.80 (*)     All other components within normal limits   COMPREHENSIVE METABOLIC PANEL - Abnormal; Notable for the following components:    Glucose 116 (*)     BUN 22 (*)     Sodium 130 (*)     Potassium 3.3 (*)     Chloride 93 (*)     All other components within normal limits   URINE RT REFLEX TO CULTURE - Abnormal; Notable for the following components:    Color, UA DARK YELLOW (*)     Bilirubin Urine   (*)     Value: Presumptive positive. Unable to confirm due to unavailability of reagent. All other components within normal limits   MICROSCOPIC URINALYSIS - Abnormal; Notable for the following components:    Mucus, UA 1+ (*)     All other components within normal limits   LIPASE         EMERGENCY DEPARTMENT COURSE:   Vitals:    Vitals:    06/07/21 0726 06/07/21 0802 06/07/21 0904   BP: (!) 146/93 (!) 140/86 133/85   Pulse: 68 57 56   Resp: 16 16 16   Temp: 97.9 °F (36.6 °C)     TempSrc: Oral     SpO2: 97% 98%    Weight: 225 lb (102.1 kg)     Height: 6' 2\" (1.88 m)       Patient tested positive here for COVID-19. Influenza is negative. He appears very well after IV fluids. Lab work is mostly unremarkable. Potassium was slightly on the low side this was replaced IV. Oxygen saturation is 98% on room air. He is okay to go home. Advised him to keep himself hydrated, return if he develops any worsening symptoms including any shortness of breath. I also advised him to self quarantine at home as much as possible. He did confirm to me that he received both doses of Moderna vaccine back in April. He is agreeable plan will be discharged home today. CRITICALCARE:      CONSULTS:  None      PROCEDURES:      FINAL IMPRESSION      1. Nausea vomiting and diarrhea    2. COVID-19 virus detected    3.  Hypokalemia            DISPOSITION/PLAN   DISPOSITION Decision To Discharge 06/07/2021 11:11:28 AM        PATIENT REFERRED TO:  PREMA Rolle - [facility]  [address] [place] [address]  [phone]    Schedule an appointment as soon as possible for a visit       [facility]  Eduardo Santana [address] [place] [address]  [phone]    As needed, If symptoms worsen      DISCHARGE MEDICATIONS:  New Prescriptions    ONDANSETRON TELECARE [facility] ODT) 4 MG DISINTEGRATING TABLET    Take 1 tablet by mouth every 8 hours as needed for Nausea       (Please note that portions ofthis note were completed with a voice recognition program.  Efforts were made to edit the dictations but occasionally words are mis-transcribed.)    Moisés Ascencio DO  Attending Emergency Physician          Moisés Ascencio DO  06/07/21 [id number]

## 2021-06-07 NOTE — ED TRIAGE NOTES
Patient to ED from home with c/o n/v/d since Friday. States he now has right flank pain and intermittent lightheadedness with standing. Denies fever, chills. Has had both covid vaccines.

## 2021-06-08 ENCOUNTER — CARE COORDINATION (OUTPATIENT)
Dept: CARE COORDINATION | Age: 56
End: 2021-06-08

## 2021-06-09 NOTE — ACP (ADVANCE CARE PLANNING)
Advance Care Planning   Healthcare Decision Maker:    Primary Decision Maker: London Little - [phone]    Secondary Decision Maker: Jose Richy - Brother/Sister - [phone]    Click here to complete Healthcare Decision Makers including selection of the Healthcare Decision Maker Relationship (ie \"Primary\"). Today we documented Decision Maker(s) consistent with Legal Next of Kin hierarchy.

## 2021-06-09 NOTE — CARE COORDINATION
Patient contacted regarding COVID-19 diagnosis. Discussed COVID-19 related testing which was available at this time. Test results were positive. Patient informed of results, if available? Patient aware of results at ED visit. LPN Care Coordinator contacted the patient by telephone to perform post discharge assessment. Call within 2 business days of discharge: Yes. Verified name and  with patient as identifiers. Provided introduction to self, and explanation of the CTN/ACM role, and reason for call due to risk factors for infection and/or exposure to COVID-19. Symptoms reviewed with patient who verbalized the following symptoms: no new symptoms and no worsening symptoms. Due to no new or worsening symptoms encounter was not routed to provider for escalation. Discussed follow-up appointments. If no appointment was previously scheduled, appointment scheduling offered: No.  [facility] follow up appointment(s):   Future Appointments   Date Time Provider Prateek Cadet   2021  4:15 PM [facility]     Non[facility] follow up appointment(s): no    Non-face-to-face services provided:  N/A     Advance Care Planning:   Does patient have an Advance Directive:  reviewed and current. Educated patient about risk for severe COVID-19 due to risk factors according to CDC guidelines. LPN CC reviewed discharge instructions, medical action plan and red flag symptoms with the patient who verbalized understanding. Discussed COVID vaccination status: No. Education provided on COVID-19 vaccination as appropriate. Discussed exposure protocols and quarantine with CDC Guidelines. Patient was given an opportunity to verbalize any questions and concerns and agrees to contact LPN CC or health care provider for questions related to their healthcare.     Reviewed and educated patient on any new and changed medications related to discharge diagnosis     Was patient discharged with a pulse oximeter? No Discussed and confirmed pulse oximeter discharge instructions and when to notify provider or seek emergency care. LPN CC provided contact information. Plan for follow-up call in 5-7 days based on severity of symptoms and risk factors.

## 2021-06-15 ENCOUNTER — CARE COORDINATION (OUTPATIENT)
Dept: CARE COORDINATION | Age: 56
End: 2021-06-15

## 2021-10-27 DIAGNOSIS — I10 ESSENTIAL HYPERTENSION: ICD-10-CM

## 2021-10-27 RX ORDER — LISINOPRIL 5 MG/1
TABLET ORAL
Qty: 30 TABLET | Refills: 0 | Status: SHIPPED | OUTPATIENT
Start: 2021-10-27 | End: 2021-11-05 | Stop reason: SDUPTHER

## 2021-10-27 NOTE — TELEPHONE ENCOUNTER
Last visit: 05/03/2021  Last Med refill: 07/28/2021  Does patient have enough medication for 72 hours: No:     Next Visit Date:  Future Appointments   Date Time Provider Prateek Cadet   11/5/2021  4:15 PM PREMA Duran CNP [facility] [address] Maintenance   Topic Date Due    Hepatitis C screen  Never done    Shingles Vaccine (1 of 2) Never done    Flu vaccine (1) 09/01/2021    Diabetes screen  03/07/2022    Potassium monitoring  06/07/2022    Creatinine monitoring  06/07/2022    Colon cancer screen colonoscopy  01/29/2024    Lipid screen  04/29/2026    DTaP/Tdap/Td vaccine (2 - Td or Tdap) 09/25/2027    Pneumococcal 0-64 years Vaccine (2 of 2 - PPSV23) 02/03/2031    COVID-19 Vaccine  Completed    Hepatitis A vaccine  Aged Out    Hepatitis B vaccine  Aged Out    Hib vaccine  Aged Out    Meningococcal (ACWY) vaccine  Aged Out    HIV screen  Discontinued       Hemoglobin A1C (%)   Date Value   03/07/2019 5.5             ( goal A1C is < 7)   No results found for: LABMICR  LDL Cholesterol (mg/dL)   Date Value   01/22/2019 116     LDL Calculated (mg/dL)   Date Value   04/29/2021 103       (goal LDL is <100)   AST (U/L)   Date Value   06/07/2021 20     ALT (U/L)   Date Value   06/07/2021 21     BUN (mg/dL)   Date Value   06/07/2021 22 (H)     BP Readings from Last 3 Encounters:   06/07/21 133/85   05/03/21 (!) 146/90   10/26/20 136/84          (goal 120/80)    All Future Testing planned in [facility]              Patient Active Problem List:     Essential hypertension     Class 1 obesity due to excess calories with serious comorbidity and body mass index (BMI) of 30.0 to 30.9 in adult     Diverticulosis of large intestine without hemorrhage     Status post right inguinal hernia repair     Closed displaced fracture of second cervical vertebra ([facility] [address]

## 2021-11-05 ENCOUNTER — OFFICE VISIT (OUTPATIENT)
Dept: FAMILY MEDICINE CLINIC | Age: 56
End: 2021-11-05

## 2021-11-05 VITALS
HEART RATE: 79 BPM | SYSTOLIC BLOOD PRESSURE: 122 MMHG | OXYGEN SATURATION: 97 % | WEIGHT: 224 LBS | BODY MASS INDEX: 28.76 KG/M2 | DIASTOLIC BLOOD PRESSURE: 74 MMHG

## 2021-11-05 DIAGNOSIS — I10 ESSENTIAL HYPERTENSION: ICD-10-CM

## 2021-11-05 PROCEDURE — 99212 OFFICE O/P EST SF 10 MIN: CPT | Performed by: NURSE PRACTITIONER

## 2021-11-05 RX ORDER — LISINOPRIL 5 MG/1
TABLET ORAL
Qty: 90 TABLET | Refills: 1 | Status: SHIPPED | OUTPATIENT
Start: 2021-11-05 | End: 2022-05-16 | Stop reason: SDUPTHER

## 2021-11-05 RX ORDER — HYDROCHLOROTHIAZIDE 12.5 MG/1
TABLET ORAL
Qty: 90 TABLET | Refills: 1 | Status: SHIPPED | OUTPATIENT
Start: 2021-11-05 | End: 2022-05-16 | Stop reason: SDUPTHER

## 2021-11-05 RX ORDER — M-VIT,TX,IRON,MINS/CALC/FOLIC 27MG-0.4MG
1 TABLET ORAL DAILY
COMMUNITY

## 2021-11-05 ASSESSMENT — ENCOUNTER SYMPTOMS
SHORTNESS OF BREATH: 0
COUGH: 0

## 2021-11-05 NOTE — PROGRESS NOTES
Emilee Stover (:  1965) is a 55 y.o. male,Established patient, here for evaluation of the following chief complaint(s):  6 Month Follow-Up and Hypertension         ASSESSMENT/PLAN:  1. Essential hypertension  -     lisinopril (PRINIVIL;ZESTRIL) 5 MG tablet; TAKE ONE TABLET BY MOUTH DAILY, Disp-90 tablet, R-1Normal  -     hydroCHLOROthiazide (HYDRODIURIL) 12.5 MG tablet; TAKE ONE TABLET BY MOUTH DAILY, Disp-90 tablet, R-1Normal      No follow-ups on file. Subjective   SUBJECTIVE/OBJECTIVE:  HPI     Declines flu shot  Had second covid- waiting for booster          Review of Systems   Constitutional: Negative for chills and fever. Respiratory: Negative for cough and shortness of breath. Cardiovascular: Negative for chest pain and leg swelling. Objective   Physical Exam  Constitutional:       General: He is not in acute distress. Appearance: He is well-developed. HENT:      Head: Normocephalic. Right Ear: External ear normal.      Left Ear: External ear normal.   Cardiovascular:      Rate and Rhythm: Normal rate and regular rhythm. Heart sounds: Normal heart sounds. Pulmonary:      Effort: Pulmonary effort is normal.      Breath sounds: Normal breath sounds. Musculoskeletal:         General: Normal range of motion. Skin:     General: Skin is warm and dry. Neurological:      Mental Status: He is alert and oriented to person, place, and time. On this date 2021 I have spent 15 minutes reviewing previous notes, test results and face to face with the patient discussing the diagnosis and importance of compliance with the treatment plan as well as documenting on the day of the visit. An electronic signature was used to authenticate this note.     --PREMA Peres - CNP

## 2022-05-16 ENCOUNTER — OFFICE VISIT (OUTPATIENT)
Dept: FAMILY MEDICINE CLINIC | Age: 57
End: 2022-05-16

## 2022-05-16 VITALS
DIASTOLIC BLOOD PRESSURE: 80 MMHG | OXYGEN SATURATION: 96 % | SYSTOLIC BLOOD PRESSURE: 132 MMHG | WEIGHT: 222 LBS | HEART RATE: 72 BPM | BODY MASS INDEX: 28.5 KG/M2

## 2022-05-16 DIAGNOSIS — Z13.220 SCREENING FOR HYPERLIPIDEMIA: ICD-10-CM

## 2022-05-16 DIAGNOSIS — Z86.39 HISTORY OF ELEVATED GLUCOSE: ICD-10-CM

## 2022-05-16 DIAGNOSIS — I10 ESSENTIAL HYPERTENSION: Primary | ICD-10-CM

## 2022-05-16 PROCEDURE — 99213 OFFICE O/P EST LOW 20 MIN: CPT | Performed by: NURSE PRACTITIONER

## 2022-05-16 RX ORDER — ASPIRIN 81 MG/1
81 TABLET ORAL DAILY
COMMUNITY

## 2022-05-16 RX ORDER — HYDROCHLOROTHIAZIDE 12.5 MG/1
TABLET ORAL
Qty: 90 TABLET | Refills: 1 | Status: SHIPPED | OUTPATIENT
Start: 2022-05-16

## 2022-05-16 RX ORDER — LISINOPRIL 5 MG/1
TABLET ORAL
Qty: 90 TABLET | Refills: 1 | Status: SHIPPED | OUTPATIENT
Start: 2022-05-16

## 2022-05-16 SDOH — ECONOMIC STABILITY: FOOD INSECURITY: WITHIN THE PAST 12 MONTHS, YOU WORRIED THAT YOUR FOOD WOULD RUN OUT BEFORE YOU GOT MONEY TO BUY MORE.: NEVER TRUE

## 2022-05-16 SDOH — ECONOMIC STABILITY: FOOD INSECURITY: WITHIN THE PAST 12 MONTHS, THE FOOD YOU BOUGHT JUST DIDN'T LAST AND YOU DIDN'T HAVE MONEY TO GET MORE.: NEVER TRUE

## 2022-05-16 ASSESSMENT — ENCOUNTER SYMPTOMS
SHORTNESS OF BREATH: 0
COUGH: 0

## 2022-05-16 ASSESSMENT — PATIENT HEALTH QUESTIONNAIRE - PHQ9
SUM OF ALL RESPONSES TO PHQ QUESTIONS 1-9: 0
SUM OF ALL RESPONSES TO PHQ9 QUESTIONS 1 & 2: 0
SUM OF ALL RESPONSES TO PHQ QUESTIONS 1-9: 0
1. LITTLE INTEREST OR PLEASURE IN DOING THINGS: 0
2. FEELING DOWN, DEPRESSED OR HOPELESS: 0
SUM OF ALL RESPONSES TO PHQ QUESTIONS 1-9: 0
SUM OF ALL RESPONSES TO PHQ QUESTIONS 1-9: 0

## 2022-05-16 ASSESSMENT — SOCIAL DETERMINANTS OF HEALTH (SDOH): HOW HARD IS IT FOR YOU TO PAY FOR THE VERY BASICS LIKE FOOD, HOUSING, MEDICAL CARE, AND HEATING?: NOT HARD AT ALL

## 2022-05-16 NOTE — PROGRESS NOTES
David Donovan (:  1965) is a 65 y.o. male,Established patient, here for evaluation of the following chief complaint(s):  Hypertension and 6 Month Follow-Up         ASSESSMENT/PLAN:  1. Essential hypertension  -     lisinopril (PRINIVIL;ZESTRIL) 5 MG tablet; TAKE ONE TABLET BY MOUTH DAILY, Disp-90 tablet, R-1Normal  -     hydroCHLOROthiazide (HYDRODIURIL) 12.5 MG tablet; TAKE ONE TABLET BY MOUTH DAILY, Disp-90 tablet, R-1Normal  -     CBC; Future  -     Comprehensive Metabolic Panel; Future  2. Screening for hyperlipidemia  -     Lipid, Fasting; Future  3. History of elevated glucose  -     Hemoglobin A1C; Future      No follow-ups on file. Subjective   SUBJECTIVE/OBJECTIVE:  HPI    Review of Systems   Constitutional: Negative for chills and fever. Respiratory: Negative for cough and shortness of breath. Cardiovascular: Negative for chest pain and leg swelling. Objective      Vitals:    22 1619   BP: 132/80   Pulse: 72   SpO2: 96%     Wt Readings from Last 3 Encounters:   22 222 lb (100.7 kg)   21 224 lb (101.6 kg)   21 225 lb (102.1 kg)       Physical Exam  Constitutional:       General: He is not in acute distress. Appearance: He is well-developed. HENT:      Head: Normocephalic. Right Ear: External ear normal.      Left Ear: External ear normal.   Cardiovascular:      Rate and Rhythm: Normal rate and regular rhythm. Heart sounds: Normal heart sounds. Pulmonary:      Effort: Pulmonary effort is normal.      Breath sounds: Normal breath sounds. Musculoskeletal:         General: Normal range of motion. Skin:     General: Skin is warm and dry. Neurological:      Mental Status: He is alert and oriented to person, place, and time. An electronic signature was used to authenticate this note.     --PREMA Covarrubias - CNP

## 2022-09-21 ENCOUNTER — HOSPITAL ENCOUNTER (EMERGENCY)
Age: 57
Discharge: HOME | End: 2022-09-21
Attending: EMERGENCY MEDICINE
Payer: COMMERCIAL

## 2022-09-21 ENCOUNTER — APPOINTMENT (OUTPATIENT)
Dept: GENERAL RADIOLOGY | Age: 57
End: 2022-09-21
Payer: COMMERCIAL

## 2022-09-21 VITALS
BODY MASS INDEX: 28.88 KG/M2 | OXYGEN SATURATION: 99 % | HEART RATE: 69 BPM | HEIGHT: 74 IN | DIASTOLIC BLOOD PRESSURE: 94 MMHG | TEMPERATURE: 97.8 F | WEIGHT: 225 LBS | SYSTOLIC BLOOD PRESSURE: 138 MMHG | RESPIRATION RATE: 21 BRPM

## 2022-09-21 DIAGNOSIS — R07.9 CHEST PAIN, UNSPECIFIED TYPE: Primary | ICD-10-CM

## 2022-09-21 DIAGNOSIS — R42 DIZZINESS: ICD-10-CM

## 2022-09-21 LAB
ALBUMIN SERPL-MCNC: 4.4 G/DL (ref 3.5–5.2)
ALP BLD-CCNC: 53 U/L (ref 40–129)
ALT SERPL-CCNC: 19 U/L (ref 5–41)
ANION GAP SERPL CALCULATED.3IONS-SCNC: 11 MMOL/L (ref 9–17)
AST SERPL-CCNC: 16 U/L
BILIRUB SERPL-MCNC: 0.3 MG/DL (ref 0.3–1.2)
BUN BLDV-MCNC: 20 MG/DL (ref 6–20)
CALCIUM SERPL-MCNC: 9.4 MG/DL (ref 8.6–10.4)
CHLORIDE BLD-SCNC: 101 MMOL/L (ref 98–107)
CO2: 25 MMOL/L (ref 20–31)
CREAT SERPL-MCNC: 0.74 MG/DL (ref 0.7–1.2)
GFR AFRICAN AMERICAN: >60 ML/MIN
GFR NON-AFRICAN AMERICAN: >60 ML/MIN
GFR SERPL CREATININE-BSD FRML MDRD: ABNORMAL ML/MIN/{1.73_M2}
GLUCOSE BLD-MCNC: 92 MG/DL (ref 70–99)
HCT VFR BLD CALC: 39.9 % (ref 41–53)
HEMOGLOBIN: 14.2 G/DL (ref 13.5–17.5)
MAGNESIUM: 2.1 MG/DL (ref 1.6–2.6)
MCH RBC QN AUTO: 33.3 PG (ref 26–34)
MCHC RBC AUTO-ENTMCNC: 35.7 G/DL (ref 31–37)
MCV RBC AUTO: 93.4 FL (ref 80–100)
PDW BLD-RTO: 12.4 % (ref 11.5–14.9)
PLATELET # BLD: 219 K/UL (ref 150–450)
PMV BLD AUTO: 6.8 FL (ref 6–12)
POTASSIUM SERPL-SCNC: 3.6 MMOL/L (ref 3.7–5.3)
RBC # BLD: 4.27 M/UL (ref 4.5–5.9)
SODIUM BLD-SCNC: 137 MMOL/L (ref 135–144)
TOTAL PROTEIN: 7 G/DL (ref 6.4–8.3)
TROPONIN, HIGH SENSITIVITY: 6 NG/L (ref 0–22)
TROPONIN, HIGH SENSITIVITY: 7 NG/L (ref 0–22)
WBC # BLD: 7.3 K/UL (ref 3.5–11)

## 2022-09-21 PROCEDURE — 80053 COMPREHEN METABOLIC PANEL: CPT

## 2022-09-21 PROCEDURE — 93005 ELECTROCARDIOGRAM TRACING: CPT | Performed by: STUDENT IN AN ORGANIZED HEALTH CARE EDUCATION/TRAINING PROGRAM

## 2022-09-21 PROCEDURE — 2580000003 HC RX 258: Performed by: STUDENT IN AN ORGANIZED HEALTH CARE EDUCATION/TRAINING PROGRAM

## 2022-09-21 PROCEDURE — 85027 COMPLETE CBC AUTOMATED: CPT

## 2022-09-21 PROCEDURE — 84484 ASSAY OF TROPONIN QUANT: CPT

## 2022-09-21 PROCEDURE — 36415 COLL VENOUS BLD VENIPUNCTURE: CPT

## 2022-09-21 PROCEDURE — 83735 ASSAY OF MAGNESIUM: CPT

## 2022-09-21 PROCEDURE — 6370000000 HC RX 637 (ALT 250 FOR IP): Performed by: STUDENT IN AN ORGANIZED HEALTH CARE EDUCATION/TRAINING PROGRAM

## 2022-09-21 PROCEDURE — 99285 EMERGENCY DEPT VISIT HI MDM: CPT

## 2022-09-21 PROCEDURE — 71046 X-RAY EXAM CHEST 2 VIEWS: CPT

## 2022-09-21 RX ORDER — ASPIRIN 81 MG/1
324 TABLET, CHEWABLE ORAL ONCE
Status: COMPLETED | OUTPATIENT
Start: 2022-09-21 | End: 2022-09-21

## 2022-09-21 RX ORDER — NITROGLYCERIN 0.4 MG/1
0.4 TABLET SUBLINGUAL ONCE
Status: COMPLETED | OUTPATIENT
Start: 2022-09-21 | End: 2022-09-21

## 2022-09-21 RX ORDER — ACETAMINOPHEN 325 MG/1
650 TABLET ORAL ONCE
Status: COMPLETED | OUTPATIENT
Start: 2022-09-21 | End: 2022-09-21

## 2022-09-21 RX ORDER — 0.9 % SODIUM CHLORIDE 0.9 %
1000 INTRAVENOUS SOLUTION INTRAVENOUS ONCE
Status: COMPLETED | OUTPATIENT
Start: 2022-09-21 | End: 2022-09-21

## 2022-09-21 RX ADMIN — NITROGLYCERIN 0.4 MG: 0.4 TABLET SUBLINGUAL at 16:18

## 2022-09-21 RX ADMIN — ACETAMINOPHEN 650 MG: 325 TABLET, FILM COATED ORAL at 16:17

## 2022-09-21 RX ADMIN — SODIUM CHLORIDE 1000 ML: 9 INJECTION, SOLUTION INTRAVENOUS at 16:21

## 2022-09-21 RX ADMIN — ASPIRIN 81 MG 324 MG: 81 TABLET ORAL at 16:17

## 2022-09-21 ASSESSMENT — ENCOUNTER SYMPTOMS
COLOR CHANGE: 0
CHEST TIGHTNESS: 0
ABDOMINAL PAIN: 0
DIARRHEA: 0
VOMITING: 0
NAUSEA: 0
BACK PAIN: 0
SHORTNESS OF BREATH: 0
COUGH: 0
TROUBLE SWALLOWING: 0

## 2022-09-21 NOTE — ED PROVIDER NOTES
Tonsillectomy; Colonoscopy (N/A, 1/29/2019); and hernia repair (Right, 3/13/2019). Social History     Socioeconomic History    Marital status:      Spouse name: Not on file    Number of children: Not on file    Years of education: Not on file    Highest education level: Not on file   Occupational History    Not on file   Tobacco Use    Smoking status: Every Day     Packs/day: 0.25     Years: 45.00     Pack years: 11.25     Types: Cigarettes    Smokeless tobacco: Former   Vaping Use    Vaping Use: Never used   Substance and Sexual Activity    Alcohol use: Yes     Comment: occassional ETOH use    Drug use: Yes     Types: Marijuana (Weed)     Comment: occassional    Sexual activity: Yes   Other Topics Concern    Not on file   Social History Narrative    Not on file     Social Determinants of Health     Financial Resource Strain: Low Risk     Difficulty of Paying Living Expenses: Not hard at all   Food Insecurity: No Food Insecurity    Worried About Running Out of Food in the Last Year: Never true    Ran Out of Food in the Last Year: Never true   Transportation Needs: Not on file   Physical Activity: Not on file   Stress: Not on file   Social Connections: Not on file   Intimate Partner Violence: Not on file   Housing Stability: Not on file       Family History   Problem Relation Age of Onset    Diabetes Mother     Other Mother     High Blood Pressure Father        Allergies:  Penicillins    Home Medications:  Prior to Admission medications    Medication Sig Start Date End Date Taking?  Authorizing Provider   aspirin 81 MG EC tablet Take 81 mg by mouth daily Pt states he takes 1 tablet every other day    Historical Provider, MD   lisinopril (PRINIVIL;ZESTRIL) 5 MG tablet TAKE ONE TABLET BY MOUTH DAILY 5/16/22   PREMA Horton CNP   hydroCHLOROthiazide (HYDRODIURIL) 12.5 MG tablet TAKE ONE TABLET BY MOUTH DAILY 5/16/22   PREMA Horton CNP   Multiple Vitamins-Minerals (THERAPEUTIC MULTIVITAMIN-MINERALS) tablet Take 1 tablet by mouth daily    Historical Provider, MD   ondansetron (ZOFRAN ODT) 4 MG disintegrating tablet Take 1 tablet by mouth every 8 hours as needed for Nausea  Patient not taking: Reported on 2021   Reyes Smoot, DO       REVIEW OF SYSTEMS    (2-9 systems for level 4, 10 or more for level 5)      Review of Systems   Constitutional:  Negative for chills, fatigue and fever. HENT:  Negative for congestion and trouble swallowing. Eyes:  Negative for visual disturbance. Respiratory:  Negative for cough, chest tightness and shortness of breath. Cardiovascular:  Positive for chest pain. Gastrointestinal:  Negative for abdominal pain, diarrhea, nausea and vomiting. Endocrine: Negative for polyuria. Genitourinary:  Negative for dysuria, flank pain, hematuria and urgency. Musculoskeletal:  Negative for back pain and myalgias. Skin:  Negative for color change. Allergic/Immunologic: Negative for immunocompromised state. Neurological:  Negative for dizziness, syncope, weakness, light-headedness and headaches. PHYSICAL EXAM   (up to 7 for level 4, 8 or more for level 5)      INITIAL VITALS:   BP (!) 138/94   Pulse 69   Temp 97.8 °F (36.6 °C) (Oral)   Resp 21   Ht 6' 2\" (1.88 m)   Wt 225 lb (102.1 kg)   SpO2 99%   BMI 28.89 kg/m²     Physical Exam  Constitutional:       Appearance: He is well-developed. HENT:      Head: Normocephalic and atraumatic. Nose: Nose normal.      Mouth/Throat:      Mouth: Mucous membranes are moist.   Eyes:      Conjunctiva/sclera: Conjunctivae normal.   Cardiovascular:      Rate and Rhythm: Normal rate and regular rhythm. Heart sounds: Normal heart sounds. No murmur heard. No friction rub. Pulmonary:      Effort: Pulmonary effort is normal. No respiratory distress. Breath sounds: Wheezing present. No rales. Chest:      Chest wall: No tenderness. Abdominal:      General: Abdomen is flat. Palpations: Abdomen is soft. There is no hepatomegaly, splenomegaly or pulsatile mass. Tenderness: no abdominal tenderness      Hernia: No hernia is present. Skin:     General: Skin is warm. Capillary Refill: Capillary refill takes less than 2 seconds. Neurological:      General: No focal deficit present. Mental Status: He is alert. Motor: No weakness.        DIFFERENTIAL  DIAGNOSIS     PLAN (LABS / IMAGING / EKG):  Orders Placed This Encounter   Procedures    XR CHEST (2 VW)    CBC    Comprehensive Metabolic Panel    Troponin    Magnesium    Cardiac Monitor    Pulse Oximetry    EKG 12 Lead    Saline lock IV       MEDICATIONS ORDERED:  Orders Placed This Encounter   Medications    aspirin chewable tablet 324 mg    nitroGLYCERIN (NITROSTAT) SL tablet 0.4 mg    acetaminophen (TYLENOL) tablet 650 mg    0.9 % sodium chloride bolus         DIAGNOSTIC RESULTS / EMERGENCY DEPARTMENT COURSE / MDM   LAB RESULTS:  Results for orders placed or performed during the hospital encounter of 09/21/22   CBC   Result Value Ref Range    WBC 7.3 3.5 - 11.0 k/uL    RBC 4.27 (L) 4.5 - 5.9 m/uL    Hemoglobin 14.2 13.5 - 17.5 g/dL    Hematocrit 39.9 (L) 41 - 53 %    MCV 93.4 80 - 100 fL    MCH 33.3 26 - 34 pg    MCHC 35.7 31 - 37 g/dL    RDW 12.4 11.5 - 14.9 %    Platelets 771 368 - 843 k/uL    MPV 6.8 6.0 - 12.0 fL   Comprehensive Metabolic Panel   Result Value Ref Range    Glucose 92 70 - 99 mg/dL    BUN 20 6 - 20 mg/dL    Creatinine 0.74 0.70 - 1.20 mg/dL    Calcium 9.4 8.6 - 10.4 mg/dL    Sodium 137 135 - 144 mmol/L    Potassium 3.6 (L) 3.7 - 5.3 mmol/L    Chloride 101 98 - 107 mmol/L    CO2 25 20 - 31 mmol/L    Anion Gap 11 9 - 17 mmol/L    Alkaline Phosphatase 53 40 - 129 U/L    ALT 19 5 - 41 U/L    AST 16 <40 U/L    Total Bilirubin 0.3 0.3 - 1.2 mg/dL    Total Protein 7.0 6.4 - 8.3 g/dL    Albumin 4.4 3.5 - 5.2 g/dL    GFR Non-African American >60 >60 mL/min    GFR African American >60 >60 mL/min    GFR Comment Troponin   Result Value Ref Range    Troponin, High Sensitivity 7 0 - 22 ng/L   Troponin   Result Value Ref Range    Troponin, High Sensitivity 6 0 - 22 ng/L   Magnesium   Result Value Ref Range    Magnesium 2.1 1.6 - 2.6 mg/dL   EKG 12 Lead   Result Value Ref Range    Ventricular Rate 67 BPM    Atrial Rate 67 BPM    P-R Interval 162 ms    QRS Duration 86 ms    Q-T Interval 400 ms    QTc Calculation (Bazett) 422 ms    P Axis 48 degrees    R Axis 12 degrees    T Axis 35 degrees         RADIOLOGY:  XR CHEST (2 VW)    Result Date: 9/21/2022  No acute findings in the chest.        EKG  See ED course    All EKG's are interpreted by the Emergency Department Physician who either signs or Co-signs this chart in the absence of a cardiologist.    EMERGENCY DEPARTMENT COURSE:  ED Course as of 09/21/22 1919   Wed Sep 21, 2022   1530 EKG reviewed:  Sinus rhythm heart rate 67 bpm, normal intervals, QTC of 422, normal T wave progression normal R wave progression, normal QRS morphology, normal axis. [BB]   6226 HEART score of 3 [KK]      ED Course User Index  [KK] Richard Maciel DO               Assessment/Plan: 67-year-old male with chest pain, cardiac work-up negative, appropriate for discharge and outpatient follow-up with primary care. Hemodynamically stable appropriate for discharge. Patient was amenable to course of care, ER return precautions given to the patient. FINAL IMPRESSION      1. Chest pain, unspecified type    2.  Dizziness          DISPOSITION / PLAN     DISPOSITION Decision To Discharge 09/21/2022 07:07:10 PM      PATIENT REFERRED TO:  [facility] ED  [facility] [address] [phone]  Go to   As needed    Unknown PREMA Moss - TERE  [address] [place] [address]  [phone]    Schedule an appointment as soon as possible for a visit in 3 days      DISCHARGE MEDICATIONS:  Discharge Medication List as of 9/21/2022  7:08 PM          Issac Lux, DO  Emergency Medicine Resident    (Please note that portions of thisnote were completed with a voice recognition program.  Efforts were made to edit the dictations but occasionally words are mis-transcribed.)        Jarrod Johnson DO  Resident  09/21/22 [address], DO  Resident  09/21/22 [id number]

## 2022-09-21 NOTE — ED TRIAGE NOTES
Mode of arrival (squad #, walk in, police, etc) : walk-in        Chief complaint(s): chest pain, dizziness        Arrival Note (brief scenario, treatment PTA, etc). : Pt reports chest pain that comes and goes x3 days. Pt reports dizziness x1 day. Pt reports hx of hypertension. C= \"Have you ever felt that you should Cut down on your drinking? \"  No  A= \"Have people Annoyed you by criticizing your drinking? \"  No  G= \"Have you ever felt bad or Guilty about your drinking? \"  No  E= \"Have you ever had a drink as an Eye-opener first thing in the morning to steady your nerves or to help a hangover? \"  No      Deferred []      Reason for deferring: N/A    *If yes to two or more: probable alcohol abuse. *

## 2022-09-21 NOTE — DISCHARGE INSTRUCTIONS
You have been seen in the ER today for chest pain. Please follow-up with your primary care doctor for additional work-up if your symptoms return. Please return to the ER if you have any concerns. If you begin to experience any symptoms such as chest pain shortness of breath nausea vomiting dizziness drowsiness abdominal pain loss of consciousness or any other symptoms you find concerning please return to the ED for follow-up evaluation. If you have been given pain medication please take them only as prescribed for the your symptoms. Do not take more medication than recommended at any given time. Please follow-up with your primary care provider within 5 to 7 days for continued care, sooner if you have concerns.

## 2022-09-21 NOTE — ED NOTES
Discharge paperwork discussed with patient. All questions answered.       Luis Osman RN  09/21/22 [id number]

## 2022-09-21 NOTE — ED PROVIDER NOTES
[address]  eMERGENCY dEPARTMENT eNCOUnter   Attending Attestation     Pt Name: Marian Manzanares  MRN: [id number]  [place] 1965  Date of evaluation: 9/21/22    History, EXAM, MDM:    Marian Manzanares is a 65 y.o. male who presents with Chest Pain and Dizziness  Cp x 1 week. Intermittent over the last week. Seems to occur after eating, also occurs in the evening when he is sitting down after working during the day. He works doing [employer] and he says he never gets the pain while he is doing some type of physical leg activity. He does have a history of  obesity, HTN, Tobacco abuse. No coronary artery disease in first-degree relatives. Physical examination shows stable vital signs. Lungs are clear and cardiac has a regular rate and rhythm with no murmurs. There is no sign of a DVT on his exam.  EKG shows a sinus rhythm. HR is 67, , QRS 86, , no LATESHA, No STD, No TWI, the axis is normal.  The chest x-ray shows no sign of a lung mass pneumothorax or pneumonia. Laboratory studies show no troponin elevation. HEART score 3. Vitals:   Vitals:    09/21/22 1409 09/21/22 1621 09/21/22 1622   BP: (!) 155/80 (!) 139/96    Pulse: 69 61    Resp: 18 17    Temp: 97.8 °F (36.6 °C)     TempSrc: Oral     SpO2: 97%  97%   Weight: 225 lb (102.1 kg)     Height: 6' 2\" (1.88 m)       I performed a history and physical examination of the patient and discussed management with the resident. I reviewed the residents note and agree with the documented findings and plan of care. Any areas of disagreement are noted on the chart. I was personally present for the key portions of any procedures. I have documented in the chart those procedures where I was not present during the key portions. I have personally reviewed all images and agree with the resident's interpretation. I have reviewed the emergency nurses triage note.  I agree with the chief complaint, past medical history, past surgical history, allergies, medications, social and family history as documented unless otherwise noted below. Documentation of the HPI, Physical Exam and Medical Decision Making performed by medical students or scribes is based on my personal performance of the HPI, PE and MDM. For Phys Assistant/ Nurse Practitioner cases/documentation I have had a face to face evaluation of this patient and have completed at least one if not all key elements of the E/M (history, physical exam, and MDM). Additional findings are as noted.     Faby Singleton MD  Attending Emergency  Physician                Faby Singleton MD  09/21/22 7542       Faby Singleton MD  09/21/22 2439

## 2022-09-22 LAB
EKG ATRIAL RATE: 67 BPM
EKG P AXIS: 48 DEGREES
EKG P-R INTERVAL: 162 MS
EKG Q-T INTERVAL: 400 MS
EKG QRS DURATION: 86 MS
EKG QTC CALCULATION (BAZETT): 422 MS
EKG R AXIS: 12 DEGREES
EKG T AXIS: 35 DEGREES
EKG VENTRICULAR RATE: 67 BPM

## 2022-10-11 ENCOUNTER — OFFICE VISIT (OUTPATIENT)
Dept: FAMILY MEDICINE CLINIC | Age: 57
End: 2022-10-11
Payer: COMMERCIAL

## 2022-10-11 VITALS
WEIGHT: 229 LBS | OXYGEN SATURATION: 96 % | HEART RATE: 68 BPM | BODY MASS INDEX: 29.4 KG/M2 | DIASTOLIC BLOOD PRESSURE: 80 MMHG | SYSTOLIC BLOOD PRESSURE: 130 MMHG

## 2022-10-11 DIAGNOSIS — I10 ESSENTIAL HYPERTENSION: Primary | ICD-10-CM

## 2022-10-11 DIAGNOSIS — R07.9 CHEST PAIN, UNSPECIFIED TYPE: ICD-10-CM

## 2022-10-11 PROCEDURE — 99213 OFFICE O/P EST LOW 20 MIN: CPT | Performed by: NURSE PRACTITIONER

## 2022-10-11 ASSESSMENT — ENCOUNTER SYMPTOMS
SHORTNESS OF BREATH: 0
COUGH: 0

## 2022-10-11 NOTE — PROGRESS NOTES
Patient is present for f/u from [facility] ED  Patient was seen on 9/21 for chest pain  Patient states he is still having chest pain  States this happens everyday, states it is mostly in the morning  States the ER suggested having a stress test

## 2022-10-11 NOTE — PROGRESS NOTES
Yaneli Anthony (:  1965) is a 65 y.o. male,Established patient, here for evaluation of the following chief complaint(s):  ED Follow-up         ASSESSMENT/PLAN:  1. Essential hypertension  -     CARDIAC STRESS TEST EXERCISE ONLY; Future  2. Chest pain, unspecified type  -     CARDIAC STRESS TEST EXERCISE ONLY; Future    No follow-ups on file. Subjective   SUBJECTIVE/OBJECTIVE:  HPI  Pt states has been getting chest pains in the mornings. Did go to the er. Going on for a few months. Er work unremarkable. They did suggest he get a stress test.  He does smoke. No sxs of gerd. Review of Systems   Constitutional:  Negative for chills and fever. Respiratory:  Negative for cough and shortness of breath. Cardiovascular:  Negative for chest pain and leg swelling. Objective   Vitals:    10/11/22 1408   BP: 130/80   Pulse: 68   SpO2: 96%     Wt Readings from Last 3 Encounters:   10/11/22 229 lb (103.9 kg)   22 225 lb (102.1 kg)   22 222 lb (100.7 kg)       Physical Exam  Constitutional:       General: He is not in acute distress. Appearance: He is well-developed. HENT:      Head: Normocephalic. Right Ear: External ear normal.      Left Ear: External ear normal.   Cardiovascular:      Rate and Rhythm: Normal rate and regular rhythm. Heart sounds: Normal heart sounds. Pulmonary:      Effort: Pulmonary effort is normal.      Breath sounds: Normal breath sounds. Musculoskeletal:         General: Normal range of motion. Skin:     General: Skin is warm and dry. Neurological:      Mental Status: He is alert and oriented to person, place, and time. An electronic signature was used to authenticate this note.     --PREMA Ovalles - CNP

## 2022-11-18 ENCOUNTER — HOSPITAL ENCOUNTER (OUTPATIENT)
Age: 57
Setting detail: SPECIMEN
Discharge: HOME | End: 2022-11-18

## 2022-11-18 ENCOUNTER — HOSPITAL ENCOUNTER (OUTPATIENT)
Dept: NON INVASIVE DIAGNOSTICS | Age: 57
Discharge: HOME | End: 2022-11-18

## 2022-11-18 DIAGNOSIS — R07.9 CHEST PAIN, UNSPECIFIED TYPE: ICD-10-CM

## 2022-11-18 DIAGNOSIS — Z86.39 HISTORY OF ELEVATED GLUCOSE: ICD-10-CM

## 2022-11-18 DIAGNOSIS — Z13.220 SCREENING FOR HYPERLIPIDEMIA: ICD-10-CM

## 2022-11-18 DIAGNOSIS — I10 ESSENTIAL HYPERTENSION: ICD-10-CM

## 2022-11-18 LAB
ALBUMIN SERPL-MCNC: 4.4 G/DL (ref 3.5–5.2)
ALBUMIN/GLOBULIN RATIO: 1.7 (ref 1–2.5)
ALP BLD-CCNC: 57 U/L (ref 40–129)
ALT SERPL-CCNC: 19 U/L (ref 5–41)
ANION GAP SERPL CALCULATED.3IONS-SCNC: 15 MMOL/L (ref 9–17)
AST SERPL-CCNC: 19 U/L
BILIRUB SERPL-MCNC: 0.6 MG/DL (ref 0.3–1.2)
BUN BLDV-MCNC: 20 MG/DL (ref 6–20)
CALCIUM SERPL-MCNC: 9 MG/DL (ref 8.6–10.4)
CHLORIDE BLD-SCNC: 96 MMOL/L (ref 98–107)
CHOLESTEROL, FASTING: 203 MG/DL
CHOLESTEROL/HDL RATIO: 4.1
CO2: 23 MMOL/L (ref 20–31)
CREAT SERPL-MCNC: 0.77 MG/DL (ref 0.7–1.2)
ESTIMATED AVERAGE GLUCOSE: 100 MG/DL
GFR SERPL CREATININE-BSD FRML MDRD: >60 ML/MIN/1.73M2
GLUCOSE BLD-MCNC: 94 MG/DL (ref 70–99)
HBA1C MFR BLD: 5.1 % (ref 4–6)
HDLC SERPL-MCNC: 49 MG/DL
LDL CHOLESTEROL: 128 MG/DL (ref 0–130)
POTASSIUM SERPL-SCNC: 3.9 MMOL/L (ref 3.7–5.3)
SODIUM BLD-SCNC: 134 MMOL/L (ref 135–144)
TOTAL PROTEIN: 7 G/DL (ref 6.4–8.3)
TRIGLYCERIDE, FASTING: 129 MG/DL

## 2022-11-18 RX ORDER — SODIUM CHLORIDE 9 MG/ML
500 INJECTION, SOLUTION INTRAVENOUS CONTINUOUS PRN
Status: DISCONTINUED | OUTPATIENT
Start: 2022-11-18 | End: 2022-11-18 | Stop reason: HOSPADM

## 2022-11-18 RX ORDER — METOPROLOL TARTRATE 5 MG/5ML
5 INJECTION INTRAVENOUS EVERY 5 MIN PRN
Status: DISCONTINUED | OUTPATIENT
Start: 2022-11-18 | End: 2022-11-18 | Stop reason: HOSPADM

## 2022-11-18 RX ORDER — ATROPINE SULFATE 0.1 MG/ML
0.5 INJECTION INTRAVENOUS EVERY 5 MIN PRN
Status: DISCONTINUED | OUTPATIENT
Start: 2022-11-18 | End: 2022-11-18 | Stop reason: HOSPADM

## 2022-11-18 RX ORDER — ALBUTEROL SULFATE 90 UG/1
2 AEROSOL, METERED RESPIRATORY (INHALATION) PRN
Status: DISCONTINUED | OUTPATIENT
Start: 2022-11-18 | End: 2022-11-19 | Stop reason: HOSPADM

## 2022-11-18 RX ORDER — SODIUM CHLORIDE 0.9 % (FLUSH) 0.9 %
5-40 SYRINGE (ML) INJECTION PRN
Status: DISCONTINUED | OUTPATIENT
Start: 2022-11-18 | End: 2022-11-18 | Stop reason: HOSPADM

## 2022-11-18 RX ORDER — NITROGLYCERIN 0.4 MG/1
0.4 TABLET SUBLINGUAL EVERY 5 MIN PRN
Status: DISCONTINUED | OUTPATIENT
Start: 2022-11-18 | End: 2022-11-18 | Stop reason: HOSPADM

## 2022-11-18 NOTE — PROGRESS NOTES
Pt arrived this AM for a scheduled treadmill stress test. Pt unable to achieve the target heart rate as he reached 114 and then did not increase further. Pt states that his hips were beginning to trouble him and he was unsure how much further he could go. We are unable to offer Lexiscan at this time as we have no Nuc Med dose available. This RN called the ordering physician's office to explain the test failure and to suggest changing the order to Bart Jc.  Also suggested to pt that he call the office as well to reschedule the test.

## 2022-12-01 ENCOUNTER — TELEPHONE (OUTPATIENT)
Dept: FAMILY MEDICINE CLINIC | Age: 57
End: 2022-12-01

## 2022-12-01 DIAGNOSIS — I10 ESSENTIAL HYPERTENSION: ICD-10-CM

## 2022-12-01 DIAGNOSIS — R07.9 CHEST PAIN, UNSPECIFIED TYPE: Primary | ICD-10-CM

## 2022-12-01 NOTE — TELEPHONE ENCOUNTER
Pt called stating he could not finish his stress test, states it was recommended for him to have another test done, but he has not heard anything       There is a note in the chart from 11/18/2022, stating it is recommended for pt to have a Peg

## 2022-12-01 NOTE — RESULT ENCOUNTER NOTE
Should be on cholesterol medication, will call to pharmacy if willing. Other labs unremarkable.      The 10-year ASCVD risk score (Ariella ACEVEDO, et al., 2019) is: 13.5%    Values used to calculate the score:      Age: 65 years      Sex: Male      Is Non- : No      Diabetic: No      Tobacco smoker: Yes      Systolic Blood Pressure: 140 mmHg      Is BP treated: Yes      HDL Cholesterol: 49 mg/dL      Total Cholesterol: 203 mg/dL

## 2022-12-07 ENCOUNTER — HOSPITAL ENCOUNTER (OUTPATIENT)
Dept: NON INVASIVE DIAGNOSTICS | Age: 57
Discharge: HOME | End: 2022-12-07

## 2022-12-07 RX ORDER — METOPROLOL TARTRATE 5 MG/5ML
5 INJECTION INTRAVENOUS EVERY 5 MIN PRN
Status: ACTIVE | OUTPATIENT
Start: 2022-12-07 | End: 2022-12-07

## 2022-12-07 RX ORDER — AMINOPHYLLINE DIHYDRATE 25 MG/ML
50 INJECTION, SOLUTION INTRAVENOUS PRN
Status: ACTIVE | OUTPATIENT
Start: 2022-12-07 | End: 2022-12-07

## 2022-12-07 RX ORDER — ALBUTEROL SULFATE 90 UG/1
2 AEROSOL, METERED RESPIRATORY (INHALATION) PRN
Status: ACTIVE | OUTPATIENT
Start: 2022-12-07 | End: 2022-12-07

## 2022-12-07 RX ORDER — ATROPINE SULFATE 0.1 MG/ML
0.5 INJECTION INTRAVENOUS EVERY 5 MIN PRN
Status: ACTIVE | OUTPATIENT
Start: 2022-12-07 | End: 2022-12-07

## 2022-12-07 RX ORDER — NITROGLYCERIN 0.4 MG/1
0.4 TABLET SUBLINGUAL EVERY 5 MIN PRN
Status: ACTIVE | OUTPATIENT
Start: 2022-12-07 | End: 2022-12-07

## 2022-12-07 RX ORDER — SODIUM CHLORIDE 0.9 % (FLUSH) 0.9 %
5-40 SYRINGE (ML) INJECTION PRN
Status: ACTIVE | OUTPATIENT
Start: 2022-12-07 | End: 2022-12-07

## 2022-12-07 RX ORDER — SODIUM CHLORIDE 9 MG/ML
500 INJECTION, SOLUTION INTRAVENOUS CONTINUOUS PRN
Status: ACTIVE | OUTPATIENT
Start: 2022-12-07 | End: 2022-12-07

## 2022-12-07 NOTE — PROGRESS NOTES
Pt was scheduled for a cardiac stress test this   AM. Pt received a reminder call yesterday at lunchtime but did not arrive at the hospital for this test.

## 2022-12-07 NOTE — PROGRESS NOTES
Received message from PT that he is sick and needs to reschedule stress testing appt. PT info sent to centralized scheduling to attempt to reschedule.     Electronically signed by Tura Lesches on 12/7/2022 at 12:34 PM

## 2022-12-09 RX ORDER — ATORVASTATIN CALCIUM 20 MG/1
20 TABLET, FILM COATED ORAL DAILY
Qty: 30 TABLET | Refills: 3 | Status: SHIPPED | OUTPATIENT
Start: 2022-12-09

## 2022-12-13 ENCOUNTER — HOSPITAL ENCOUNTER (OUTPATIENT)
Dept: NON INVASIVE DIAGNOSTICS | Age: 57
Discharge: HOME | End: 2022-12-13
Payer: COMMERCIAL

## 2022-12-13 ENCOUNTER — HOSPITAL ENCOUNTER (OUTPATIENT)
Dept: NUCLEAR MEDICINE | Age: 57
Discharge: HOME | End: 2022-12-15
Payer: COMMERCIAL

## 2022-12-13 DIAGNOSIS — I10 ESSENTIAL HYPERTENSION: ICD-10-CM

## 2022-12-13 DIAGNOSIS — R07.9 CHEST PAIN, UNSPECIFIED TYPE: ICD-10-CM

## 2022-12-13 PROCEDURE — A9500 TC99M SESTAMIBI: HCPCS | Performed by: NURSE PRACTITIONER

## 2022-12-13 PROCEDURE — 3430000000 HC RX DIAGNOSTIC RADIOPHARMACEUTICAL: Performed by: NURSE PRACTITIONER

## 2022-12-13 PROCEDURE — 78452 HT MUSCLE IMAGE SPECT MULT: CPT

## 2022-12-13 PROCEDURE — 93017 CV STRESS TEST TRACING ONLY: CPT

## 2022-12-13 PROCEDURE — 2580000003 HC RX 258: Performed by: NURSE PRACTITIONER

## 2022-12-13 PROCEDURE — 6360000002 HC RX W HCPCS: Performed by: NURSE PRACTITIONER

## 2022-12-13 RX ORDER — SODIUM CHLORIDE 0.9 % (FLUSH) 0.9 %
10 SYRINGE (ML) INJECTION PRN
Status: DISCONTINUED | OUTPATIENT
Start: 2022-12-13 | End: 2022-12-16 | Stop reason: HOSPADM

## 2022-12-13 RX ORDER — NITROGLYCERIN 0.4 MG/1
0.4 TABLET SUBLINGUAL EVERY 5 MIN PRN
Status: DISCONTINUED | OUTPATIENT
Start: 2022-12-13 | End: 2022-12-13 | Stop reason: HOSPADM

## 2022-12-13 RX ORDER — ALBUTEROL SULFATE 90 UG/1
2 AEROSOL, METERED RESPIRATORY (INHALATION) PRN
Status: DISCONTINUED | OUTPATIENT
Start: 2022-12-13 | End: 2022-12-13 | Stop reason: HOSPADM

## 2022-12-13 RX ORDER — METOPROLOL TARTRATE 5 MG/5ML
5 INJECTION INTRAVENOUS EVERY 5 MIN PRN
Status: DISCONTINUED | OUTPATIENT
Start: 2022-12-13 | End: 2022-12-13 | Stop reason: HOSPADM

## 2022-12-13 RX ORDER — SODIUM CHLORIDE 9 MG/ML
500 INJECTION, SOLUTION INTRAVENOUS CONTINUOUS PRN
Status: DISCONTINUED | OUTPATIENT
Start: 2022-12-13 | End: 2022-12-13 | Stop reason: HOSPADM

## 2022-12-13 RX ORDER — TECHNETIUM TC-99M SESTAMIBI 1 MG/10ML
34.5 INJECTION INTRAVENOUS
Status: COMPLETED | OUTPATIENT
Start: 2022-12-13 | End: 2022-12-13

## 2022-12-13 RX ORDER — SODIUM CHLORIDE 0.9 % (FLUSH) 0.9 %
5-40 SYRINGE (ML) INJECTION PRN
Status: DISCONTINUED | OUTPATIENT
Start: 2022-12-13 | End: 2022-12-13 | Stop reason: HOSPADM

## 2022-12-13 RX ORDER — ATROPINE SULFATE 0.1 MG/ML
0.5 INJECTION INTRAVENOUS EVERY 5 MIN PRN
Status: DISCONTINUED | OUTPATIENT
Start: 2022-12-13 | End: 2022-12-13 | Stop reason: HOSPADM

## 2022-12-13 RX ORDER — TECHNETIUM TC-99M SESTAMIBI 1 MG/10ML
10 INJECTION INTRAVENOUS
Status: COMPLETED | OUTPATIENT
Start: 2022-12-13 | End: 2022-12-13

## 2022-12-13 RX ORDER — AMINOPHYLLINE DIHYDRATE 25 MG/ML
50 INJECTION, SOLUTION INTRAVENOUS PRN
Status: DISCONTINUED | OUTPATIENT
Start: 2022-12-13 | End: 2022-12-13 | Stop reason: HOSPADM

## 2022-12-13 RX ADMIN — Medication 10.3 MILLICURIE: at 08:03

## 2022-12-13 RX ADMIN — Medication 34.5 MILLICURIE: at 09:30

## 2022-12-13 RX ADMIN — REGADENOSON 0.4 MG: 0.08 INJECTION, SOLUTION INTRAVENOUS at 09:27

## 2022-12-13 RX ADMIN — SODIUM CHLORIDE, PRESERVATIVE FREE 10 ML: 5 INJECTION INTRAVENOUS at 08:03

## 2022-12-13 NOTE — PROGRESS NOTES
CST Lexiscan. Stress Tech performs patient preparation of physical comfort, review test procedures, pre-stress EKG. Lung Sounds clear in all fields. Consent verified. Educated patient on test procedure and possible side effects of Lexiscan. Cardiologist reviewed pre-test EKG and is present for test. Patient tolerated test well with minor SOB which resolved to baseline after test with caffeine. EKG portion of test complete, Nuc Med portion pending.    Pretest VS: /84 HR 53  Post test VS: /80 HR 65

## 2022-12-16 NOTE — PROCEDURES
[address] [place], [place] [address]                              CARDIAC STRESS TEST    PATIENT NAME: Jovon Muir                 :        1965  MED REC NO:   [id number]                              ROOM:  ACCOUNT NO:   [de-identified]                           ADMIT DATE: 2022  PROVIDER:     Pam Fitch    DATE OF STUDY:  2022    TEST TYPE: LEXISCAN CARDIOLYTE STRESS TEST  INDICATION: CHEST PAIN  REFERRING PHYSICIAN: Devika Waite    RESTING HEART RATE: 53 BEATS PER MINUTE  RESTING BLOOD PRESSURE: 131/89    MEDICATION(S) GIVEN: 0.4MG IV LEXISCAN  REASON FOR TERMINATION: MEDICATION INFUSION COMPLETE    RESTING EKG: NORMAL  STRESS HEART RESPONSE: NORMAL RESPONSE  BLOOD PRESSURE RESPONSE: APPROPRIATE  STRESS EKGs: NO CHANGES SEEN  ISCHEMIC EKG CHANGES: NONE    EKG IMPRESSION: ELECTROCARDIOGRAPHICALLY NEGATIVE LEXISCAN STRESS TEST. RADIOISOTOPE RESULTS TO FOLLOW FROM THE DEPARTMENT OF NUCLEAR MEDICINE.     Arch Gagandeep    D: 2022 12:54:25       T: 2022 12:55:39     MT/DANE  Job#: [id number]     Doc#:    CC:    (Retain this field even if not dictated or not decipherable)

## 2022-12-18 DIAGNOSIS — I10 ESSENTIAL HYPERTENSION: ICD-10-CM

## 2022-12-19 ENCOUNTER — OFFICE VISIT (OUTPATIENT)
Dept: FAMILY MEDICINE CLINIC | Age: 57
End: 2022-12-19
Payer: COMMERCIAL

## 2022-12-19 VITALS
SYSTOLIC BLOOD PRESSURE: 130 MMHG | OXYGEN SATURATION: 98 % | WEIGHT: 235 LBS | DIASTOLIC BLOOD PRESSURE: 74 MMHG | BODY MASS INDEX: 30.17 KG/M2 | HEART RATE: 66 BPM

## 2022-12-19 DIAGNOSIS — Z23 NEED FOR INFLUENZA VACCINATION: ICD-10-CM

## 2022-12-19 DIAGNOSIS — I10 ESSENTIAL HYPERTENSION: Primary | ICD-10-CM

## 2022-12-19 DIAGNOSIS — Z91.89 AT INCREASED RISK FOR CARDIOVASCULAR DISEASE: ICD-10-CM

## 2022-12-19 PROCEDURE — 3074F SYST BP LT 130 MM HG: CPT | Performed by: NURSE PRACTITIONER

## 2022-12-19 PROCEDURE — 90674 CCIIV4 VAC NO PRSV 0.5 ML IM: CPT | Performed by: NURSE PRACTITIONER

## 2022-12-19 PROCEDURE — 3078F DIAST BP <80 MM HG: CPT | Performed by: NURSE PRACTITIONER

## 2022-12-19 PROCEDURE — 90471 IMMUNIZATION ADMIN: CPT | Performed by: NURSE PRACTITIONER

## 2022-12-19 PROCEDURE — 99213 OFFICE O/P EST LOW 20 MIN: CPT | Performed by: NURSE PRACTITIONER

## 2022-12-19 RX ORDER — LISINOPRIL 5 MG/1
TABLET ORAL
Qty: 90 TABLET | Refills: 1 | Status: SHIPPED | OUTPATIENT
Start: 2022-12-19

## 2022-12-19 RX ORDER — HYDROCHLOROTHIAZIDE 12.5 MG/1
TABLET ORAL
Qty: 90 TABLET | Refills: 1 | Status: SHIPPED | OUTPATIENT
Start: 2022-12-19

## 2022-12-19 RX ORDER — LISINOPRIL 5 MG/1
TABLET ORAL
Qty: 90 TABLET | Refills: 1 | OUTPATIENT
Start: 2022-12-19

## 2022-12-19 ASSESSMENT — ENCOUNTER SYMPTOMS
SHORTNESS OF BREATH: 0
COUGH: 0

## 2022-12-19 NOTE — PROGRESS NOTES
Jaden Nieto (:  1965) is a 65 y.o. male,Established patient, here for evaluation of the following chief complaint(s):  Hypertension and Results         ASSESSMENT/PLAN:  1. Essential hypertension  -     lisinopril (PRINIVIL;ZESTRIL) 5 MG tablet; TAKE ONE TABLET BY MOUTH DAILY, Disp-90 tablet, R-1Normal  -     hydroCHLOROthiazide (HYDRODIURIL) 12.5 MG tablet; TAKE ONE TABLET BY MOUTH DAILY, Disp-90 tablet, R-1Normal  2. Need for influenza vaccination  -     Influenza, FLUCELVAX, (age 10 mo+), IM, Preservative Free, 0.5 mL  3. At increased risk for cardiovascular disease  Discussed cv risk factors and reducing overall risk. The 10-year ASCVD risk score (Ariella ACEVEDO, et al., 2019) is: 13.5%    Values used to calculate the score:      Age: 65 years      Sex: Male      Is Non- : No      Diabetic: No      Tobacco smoker: Yes      Systolic Blood Pressure: 936 mmHg      Is BP treated: Yes      HDL Cholesterol: 49 mg/dL      Total Cholesterol: 203 mg/dL    No follow-ups on file. Subjective   SUBJECTIVE/OBJECTIVE:  Hypertension  The problem is controlled. Pertinent negatives include no chest pain or shortness of breath. Risk factors for coronary artery disease include male gender and smoking/tobacco exposure. Past treatments include ACE inhibitors and diuretics. Is working on cutting back on smoking. Review of Systems   Constitutional:  Negative for chills and fever. Respiratory:  Negative for cough and shortness of breath. Cardiovascular:  Negative for chest pain and leg swelling. Objective   Vitals:    22 0851   BP: 130/74   Pulse: 66   SpO2: 98%     Wt Readings from Last 3 Encounters:   22 235 lb (106.6 kg)   10/11/22 229 lb (103.9 kg)   22 225 lb (102.1 kg)       Physical Exam  Constitutional:       General: He is not in acute distress. Appearance: He is well-developed. HENT:      Head: Normocephalic.       Right Ear: External ear normal.      Left Ear: External ear normal.   Cardiovascular:      Rate and Rhythm: Normal rate and regular rhythm. Heart sounds: Normal heart sounds. Pulmonary:      Effort: Pulmonary effort is normal.      Breath sounds: Normal breath sounds. Musculoskeletal:         General: Normal range of motion. Skin:     General: Skin is warm and dry. Neurological:      Mental Status: He is alert and oriented to person, place, and time. An electronic signature was used to authenticate this note.     --PREMA Wilson - CNP

## 2023-02-06 ENCOUNTER — TELEPHONE (OUTPATIENT)
Dept: OTHER | Age: 58
End: 2023-02-06

## 2023-02-21 ENCOUNTER — APPOINTMENT (OUTPATIENT)
Dept: GENERAL RADIOLOGY | Age: 58
End: 2023-02-21
Payer: COMMERCIAL

## 2023-02-21 ENCOUNTER — HOSPITAL ENCOUNTER (EMERGENCY)
Age: 58
Discharge: HOME | End: 2023-02-21
Attending: EMERGENCY MEDICINE
Payer: COMMERCIAL

## 2023-02-21 VITALS
HEIGHT: 74 IN | TEMPERATURE: 98 F | BODY MASS INDEX: 29.52 KG/M2 | HEART RATE: 68 BPM | WEIGHT: 230 LBS | SYSTOLIC BLOOD PRESSURE: 133 MMHG | DIASTOLIC BLOOD PRESSURE: 99 MMHG | OXYGEN SATURATION: 97 % | RESPIRATION RATE: 16 BRPM

## 2023-02-21 DIAGNOSIS — M43.6 TORTICOLLIS: Primary | ICD-10-CM

## 2023-02-21 PROCEDURE — 6360000002 HC RX W HCPCS: Performed by: NURSE PRACTITIONER

## 2023-02-21 PROCEDURE — 99284 EMERGENCY DEPT VISIT MOD MDM: CPT

## 2023-02-21 PROCEDURE — 96372 THER/PROPH/DIAG INJ SC/IM: CPT

## 2023-02-21 PROCEDURE — 72040 X-RAY EXAM NECK SPINE 2-3 VW: CPT

## 2023-02-21 RX ORDER — TIZANIDINE 4 MG/1
4 TABLET ORAL EVERY 8 HOURS PRN
Qty: 20 TABLET | Refills: 0 | Status: SHIPPED | OUTPATIENT
Start: 2023-02-21

## 2023-02-21 RX ORDER — KETOROLAC TROMETHAMINE 30 MG/ML
30 INJECTION, SOLUTION INTRAMUSCULAR; INTRAVENOUS ONCE
Status: COMPLETED | OUTPATIENT
Start: 2023-02-21 | End: 2023-02-21

## 2023-02-21 RX ORDER — DEXAMETHASONE SODIUM PHOSPHATE 10 MG/ML
8 INJECTION, SOLUTION INTRAMUSCULAR; INTRAVENOUS ONCE
Status: COMPLETED | OUTPATIENT
Start: 2023-02-21 | End: 2023-02-21

## 2023-02-21 RX ADMIN — DEXAMETHASONE SODIUM PHOSPHATE 8 MG: 10 INJECTION, SOLUTION INTRAMUSCULAR; INTRAVENOUS at 09:38

## 2023-02-21 RX ADMIN — KETOROLAC TROMETHAMINE 30 MG: 30 INJECTION, SOLUTION INTRAMUSCULAR; INTRAVENOUS at 09:38

## 2023-02-21 ASSESSMENT — PAIN - FUNCTIONAL ASSESSMENT: PAIN_FUNCTIONAL_ASSESSMENT: 0-10

## 2023-02-21 ASSESSMENT — ENCOUNTER SYMPTOMS
SHORTNESS OF BREATH: 0
BACK PAIN: 0
COLOR CHANGE: 0

## 2023-02-21 ASSESSMENT — PAIN SCALES - GENERAL
PAINLEVEL_OUTOF10: 8
PAINLEVEL_OUTOF10: 8

## 2023-02-21 NOTE — DISCHARGE INSTRUCTIONS
Zanaflex:  WARNING:  May cause drowsiness. May impair ability to operate vehicles or machinery. Do not use in combination with alcohol. PLEASE RETURN TO THE EMERGENCY DEPARTMENT IMMEDIATELY for re-evaluation if your symptoms worsen in any way or if there is no improvement over the next 48 hours. You should immediately return to the ER for symptoms such as increasing pain, bloody stool, fever, a feeling of passing out, lightheadedness, dizziness, chest pain, shortness of breath, persistent nausea and/or vomiting, numbness or weakness to the arms or legs, coolness or color change of the arms or legs. Take your medication as indicated and prescribed. Yadiel 71!!!    From [facility] ([facility]) and [facility] Emergency Services    On behalf of the Emergency Department staff at [facility], I would like to thank you for giving us the opportunity to address your health care needs and concerns. We hope that during your visit, our service was delivered in a professional and caring manner. Please keep [facility] ([facility]) in mind as we walk with you down the path to your own personal wellness. Please understand that early in the process of an illness or injury, an emergency department workup can be falsely reassuring. If you notice any worsening, changing or persistent symptoms please call your family doctor or return to the ER immediately.

## 2023-02-21 NOTE — ED PROVIDER NOTES
[address] ED  eMERGENCY dEPARTMENT eNCOUnter      Pt Name: Courtney San  MRN: [id number]  [place] 1965  Date of evaluation: 2/21/2023  Provider: PREMA Bentley [id number]       Chief Complaint   Patient presents with    Torticollis         HISTORY OF PRESENT ILLNESS  (Location/Symptom, Timing/Onset, Context/Setting, Quality, Duration, Modifying Factors, Severity.)   Courtney San is a 63 y.o. male who presents to the emergency department. C/o right-sided neck pain. Onset was upon waking yesterday. Denies fever, chills, injury, weakness, dizziness. Denies N/T to his extremities. Reports  hx of a C2 fracture a few years ago. Rates his pain 8/10 at this time. He has been taking Motrin. Nursing Notes were reviewed.     ALLERGIES     Penicillins    CURRENT MEDICATIONS       Discharge Medication List as of 2/21/2023 10:37 AM        CONTINUE these medications which have NOT CHANGED    Details   lisinopril (PRINIVIL;ZESTRIL) 5 MG tablet TAKE ONE TABLET BY MOUTH DAILY, Disp-90 tablet, R-1Normal      hydroCHLOROthiazide (HYDRODIURIL) 12.5 MG tablet TAKE ONE TABLET BY MOUTH DAILY, Disp-90 tablet, R-1Normal      atorvastatin (LIPITOR) 20 MG tablet Take 1 tablet by mouth daily, Disp-30 tablet, R-3Normal      aspirin 81 MG EC tablet Take 81 mg by mouth daily Pt states he takes 1 tablet every other dayHistorical Med      Multiple Vitamins-Minerals (THERAPEUTIC MULTIVITAMIN-MINERALS) tablet Take 1 tablet by mouth dailyHistorical Med      ondansetron (ZOFRAN ODT) 4 MG disintegrating tablet Take 1 tablet by mouth every 8 hours as needed for Nausea, Disp-20 tablet, R-0Print             PAST MEDICAL HISTORY         Diagnosis Date    Diverticulosis     History of colonoscopy 01/29/2019    hyperplastic polyp, tubular adenoma    Hypertension     Inguinal hernia     right    Wears dentures     upper       SURGICAL HISTORY           Procedure Laterality Date    BACK SURGERY COLONOSCOPY N/A 01/29/2019    hyperplastic polyp, tubular adenoma    HERNIA REPAIR Right 03/13/2019    HERNIA INGUINAL REPAIR WITH MESH performed by Saqib Beckham MD at [address]      plate in chin    TONSILLECTOMY           FAMILY HISTORY           Problem Relation Age of Onset    Diabetes Mother     Other Mother     High Blood Pressure Father      Family Status   Relation Name Status    Mother  Alive    Father  Alive        SOCIAL HISTORY      reports that he has been smoking cigarettes. He has a 11.25 pack-year smoking history. He has quit using smokeless tobacco. He reports current alcohol use. He reports current drug use. Drug: Marijuana Daril Levon). REVIEW OF SYSTEMS    (2-9 systems for level 4, 10 or more for level 5)     Review of Systems   Constitutional:  Negative for chills, diaphoresis, fatigue and fever. Respiratory:  Negative for shortness of breath. Cardiovascular:  Negative for chest pain. Musculoskeletal:  Positive for myalgias and neck pain. Negative for back pain and gait problem. Skin:  Negative for color change, rash and wound. Neurological:  Negative for dizziness, weakness, light-headedness, numbness and headaches. Except as noted above the remainder of the review of systems was reviewed and negative. PHYSICAL EXAM    (up to 7 for level 4, 8 or more for level 5)     ED Triage Vitals [02/21/23 0903]   BP Temp Temp Source Heart Rate Resp SpO2 Height Weight   (!) 133/99 98 °F (36.7 °C) Oral 68 16 97 % 6' 2\" (1.88 m) 230 lb (104.3 kg)     Physical Exam  Vitals reviewed. Constitutional:       General: He is not in acute distress. Appearance: He is well-developed. He is not diaphoretic. Eyes:      General: No scleral icterus. Conjunctiva/sclera: Conjunctivae normal.   Cardiovascular:      Rate and Rhythm: Normal rate. Pulses: Normal pulses. Pulmonary:      Effort: Pulmonary effort is normal. No respiratory distress.       Breath sounds: No stridor. Musculoskeletal:      Cervical back: Neck supple. Torticollis and tenderness present. No swelling, edema, deformity, erythema, rigidity or bony tenderness. Decreased range of motion. Thoracic back: No swelling, deformity, tenderness or bony tenderness. Back:       Comments: Moves extremities. Skin:     General: Skin is warm and dry. Capillary Refill: Capillary refill takes less than 2 seconds. Findings: No rash. Neurological:      Mental Status: He is alert and oriented to person, place, and time. GCS: GCS eye subscore is 4. GCS verbal subscore is 5. GCS motor subscore is 6. Cranial Nerves: Cranial nerves 2-12 are intact. Motor: Motor function is intact. Coordination: Coordination is intact. Psychiatric:         Behavior: Behavior normal.         DIAGNOSTIC RESULTS     RADIOLOGY:   Non-plain film images such as CT, Ultrasound and MRI are read by the radiologist. Plain radiographic images are visualized and preliminarily interpreted by the emergency physician with the below findings:    Interpretation per the Radiologist below, if available at the time of this note:    XR CERVICAL SPINE (2-3 VIEWS)    Result Date: 2/21/2023  EXAMINATION: XRAY VIEWS OF THE CERVICAL SPINE 2/21/2023 9:38 am COMPARISON: None. HISTORY: ORDERING SYSTEM PROVIDED HISTORY: pain TECHNOLOGIST PROVIDED HISTORY: pain Reason for Exam: Pain to neck x few days. Woke up and couldn't rotate head. History of old neck fracture years ago. FINDINGS: Vertebral Body Height: Preserved. Bulky anterior marginal osteophytes are noted at C5, C6, and C7 Alignment: Trace reversal of the normal cervical lordosis centered at C5-C6 Disc spaces: Severe disc space narrowing at C5-C6 and C6-C7 and C7-T1. Posterior Elements: Prominent uncovertebral joint hypertrophy of C6 and C7. Facet arthrosis is C5-C6 C6-C7. Soft Tissues: Calcification of the bilateral carotid bulbs.      No acute osseous abnormality. Spondylosis of the cervical spine, greatest at C5-C6 and C6-C7. EMERGENCY DEPARTMENT COURSE and DIFFERENTIAL DIAGNOSIS/MDM:   Vitals:    Vitals:    02/21/23 0903   BP: (!) 133/99   Pulse: 68   Resp: 16   Temp: 98 °F (36.7 °C)   TempSrc: Oral   SpO2: 97%   Weight: 230 lb (104.3 kg)   Height: 6' 2\" (1.88 m)         MEDICATIONS GIVEN IN THE ED:  Medications   dexamethasone (DECADRON) (PF) 10 mg/mL injection (8 mg IntraMUSCular Given 2/21/23 0920)   ketorolac (TORADOL) injection 30 mg (30 mg IntraMUSCular Given 2/21/23 5917)       CLINICAL DECISION MAKING:  The patient presented alert with a nontoxic appearance and was seen in conjunction with Dr. Donnie Noriega. I will order an x-ray of the cervical spine. The patient was involved in his plan of care through shared decision making. The testing that was ordered was discussed with the patient. Any medications that may have been ordered were discussed with the patient. I have reviewed the patient's previous medical records using the electronic health record that we have available that were pertinent to today's visit. Imaging was reviewed and reported by the radiologist. Results showed:  No acute osseous abnormality. Spondylosis of the cervical spine, greatest at C5-C6 and C6-C7. Findings were discussed with the patient. He reported he has motrin 800 mg tablets at home. He does not want any narcotic medication. A prescription was provided for Zanaflex. The patient was advised to not drink alcohol, drive, or operate heavy machinery while taking  the medication. Follow up with pcp for a recheck, further evaluation and treatment. Evaluation and treatment course in the ED, and plan of care upon discharge was discussed in length with the patient. Patient had no further questions prior to being discharged and was instructed to return to the ED for new or worsening symptoms.  Care was provided during an unprecedented national emergency due to the novel coronavirus, Covid-19. FINAL IMPRESSION      1.  Torticollis            Problem List  Patient Active Problem List   Diagnosis Code    Essential hypertension I10    Class 1 obesity due to excess calories with serious comorbidity and body mass index (BMI) of 30.0 to 30.9 in adult E66.09, Z68.30    Diverticulosis of large intestine without hemorrhage K57.30    Status post right inguinal hernia repair Z98.890, Z87.19    Closed displaced fracture of second cervical vertebra ([facility] [address] S12.100A         DISPOSITION/PLAN   DISPOSITION Decision To Discharge 02/21/2023 10:36:01 AM      PATIENT REFERRED TO:   PREMA Rogers CNP  [address]  [phone]    Schedule an appointment as soon as possible for a visit       [facility] ED  [address] [facility]  [phone]    If symptoms worsen, As needed    DISCHARGE MEDICATIONS:     Discharge Medication List as of 2/21/2023 10:37 AM        START taking these medications    Details   tiZANidine (ZANAFLEX) 4 MG tablet Take 1 tablet by mouth every 8 hours as needed (back pain), Disp-20 tablet, R-0Normal                 (Please note that portions of this note were completed with a voice recognition program.  Efforts were made to edit the dictations but occasionally words are mis-transcribed.)    PREMA Chávez CNP, APRN - CNP  02/21/23 8963

## 2023-02-25 NOTE — ED PROVIDER NOTES
eMERGENCY dEPARTMENT eNCOUnter   [address] Name: Moira Lezama  MRN: [id number]  [place] 1965  Date of evaluation: 2/25/23     Moira Lezama is a 63 y.o. male with CC: Torticollis        This visit was performed by both a physician and an APC. I performed all aspects of the MDM as documented. The care is provided during an unprecedented national emergency due to the novel coronavirus, COVID 19.     Lupe Art MD  Attending Emergency Physician            Lupe Art MD  02/25/23 2418

## 2023-02-27 ENCOUNTER — OFFICE VISIT (OUTPATIENT)
Dept: FAMILY MEDICINE CLINIC | Age: 58
End: 2023-02-27
Payer: COMMERCIAL

## 2023-02-27 VITALS
OXYGEN SATURATION: 98 % | DIASTOLIC BLOOD PRESSURE: 80 MMHG | HEART RATE: 81 BPM | BODY MASS INDEX: 30.04 KG/M2 | WEIGHT: 234 LBS | SYSTOLIC BLOOD PRESSURE: 130 MMHG

## 2023-02-27 DIAGNOSIS — M54.31 SCIATICA, RIGHT SIDE: Primary | ICD-10-CM

## 2023-02-27 DIAGNOSIS — I10 ESSENTIAL HYPERTENSION: ICD-10-CM

## 2023-02-27 PROCEDURE — 99214 OFFICE O/P EST MOD 30 MIN: CPT | Performed by: NURSE PRACTITIONER

## 2023-02-27 PROCEDURE — 3075F SYST BP GE 130 - 139MM HG: CPT | Performed by: NURSE PRACTITIONER

## 2023-02-27 PROCEDURE — 3079F DIAST BP 80-89 MM HG: CPT | Performed by: NURSE PRACTITIONER

## 2023-02-27 RX ORDER — TIZANIDINE 4 MG/1
4 TABLET ORAL EVERY 8 HOURS PRN
Qty: 20 TABLET | Refills: 2 | Status: SHIPPED | OUTPATIENT
Start: 2023-02-27

## 2023-02-27 RX ORDER — HYDROCHLOROTHIAZIDE 12.5 MG/1
TABLET ORAL
Qty: 90 TABLET | Refills: 0 | Status: SHIPPED | OUTPATIENT
Start: 2023-06-16

## 2023-02-27 RX ORDER — LISINOPRIL 5 MG/1
TABLET ORAL
Qty: 90 TABLET | Refills: 0 | Status: SHIPPED | OUTPATIENT
Start: 2023-06-16

## 2023-02-27 RX ORDER — MELOXICAM 7.5 MG/1
7.5 TABLET ORAL DAILY PRN
Qty: 30 TABLET | Refills: 2 | Status: SHIPPED | OUTPATIENT
Start: 2023-02-27

## 2023-02-27 SDOH — ECONOMIC STABILITY: INCOME INSECURITY: HOW HARD IS IT FOR YOU TO PAY FOR THE VERY BASICS LIKE FOOD, HOUSING, MEDICAL CARE, AND HEATING?: NOT HARD AT ALL

## 2023-02-27 SDOH — ECONOMIC STABILITY: HOUSING INSECURITY
IN THE LAST 12 MONTHS, WAS THERE A TIME WHEN YOU DID NOT HAVE A STEADY PLACE TO SLEEP OR SLEPT IN A SHELTER (INCLUDING NOW)?: NO

## 2023-02-27 SDOH — ECONOMIC STABILITY: FOOD INSECURITY: WITHIN THE PAST 12 MONTHS, YOU WORRIED THAT YOUR FOOD WOULD RUN OUT BEFORE YOU GOT MONEY TO BUY MORE.: NEVER TRUE

## 2023-02-27 SDOH — ECONOMIC STABILITY: FOOD INSECURITY: WITHIN THE PAST 12 MONTHS, THE FOOD YOU BOUGHT JUST DIDN'T LAST AND YOU DIDN'T HAVE MONEY TO GET MORE.: NEVER TRUE

## 2023-02-27 ASSESSMENT — PATIENT HEALTH QUESTIONNAIRE - PHQ9
SUM OF ALL RESPONSES TO PHQ9 QUESTIONS 1 & 2: 0
SUM OF ALL RESPONSES TO PHQ QUESTIONS 1-9: 0
2. FEELING DOWN, DEPRESSED OR HOPELESS: 0
SUM OF ALL RESPONSES TO PHQ QUESTIONS 1-9: 0
1. LITTLE INTEREST OR PLEASURE IN DOING THINGS: 0

## 2023-02-27 ASSESSMENT — ENCOUNTER SYMPTOMS
BACK PAIN: 0
COUGH: 0
SHORTNESS OF BREATH: 0

## 2023-02-27 NOTE — PROGRESS NOTES
Mannie Harada (:  1965) is a 63 y.o. male,Established patient, here for evaluation of the following chief complaint(s):  ED Follow-up         ASSESSMENT/PLAN:  1. Sciatica, right side  -     tiZANidine (ZANAFLEX) 4 MG tablet; Take 1 tablet by mouth every 8 hours as needed (back pain), Disp-20 tablet, R-2Normal  -     meloxicam (MOBIC) 7.5 MG tablet; Take 1 tablet by mouth daily as needed for Pain, Disp-30 tablet, R-2Normal  2. Essential hypertension  -     lisinopril (PRINIVIL;ZESTRIL) 5 MG tablet; TAKE ONE TABLET BY MOUTH DAILY, Disp-90 tablet, R-0Normal  -     hydroCHLOROthiazide (HYDRODIURIL) 12.5 MG tablet; TAKE ONE TABLET BY MOUTH DAILY, Disp-90 tablet, R-0Normal    No follow-ups on file. Subjective   SUBJECTIVE/OBJECTIVE:  Leg Pain   There was no injury mechanism. The pain is present in the right leg. Quality: numbness, tingling. The pain is mild. The pain has been Fluctuating since onset. Associated symptoms include numbness and tingling. Pertinent negatives include no loss of motion, loss of sensation or muscle weakness. Treatments tried: muscle relaxer. Pt c/o right leg pain. States it seems like it's worse when he is laid off because he has been sitting around more. Pain is there for about ten years. No evidence of compression fracture, spondylolisthesis or acute bony pathology. 2.  Diffuse degenerative arthritis in the lumbar spine, worse at L5-S1. There is a mild dextroscoliosis. 3.  Associated disc bulging is seen at the level of L3-L4 and L4-L5. Mild degree of spinal stenosis is seen at a few levels. There is narrowing of the neural foramina at L5-S1. Review of Systems   Constitutional:  Negative for chills and fever. Respiratory:  Negative for cough and shortness of breath. Cardiovascular:  Negative for chest pain and leg swelling. Musculoskeletal:  Negative for back pain. Neurological:  Positive for tingling and numbness.         Objective   Vitals: 02/27/23 1105   BP: 130/80   Pulse: 81   SpO2: 98%     Wt Readings from Last 3 Encounters:   02/27/23 234 lb (106.1 kg)   02/21/23 230 lb (104.3 kg)   12/19/22 235 lb (106.6 kg)       Physical Exam  Constitutional:       General: He is not in acute distress.     Appearance: He is well-developed.   HENT:      Head: Normocephalic.      Right Ear: External ear normal.      Left Ear: External ear normal.   Cardiovascular:      Rate and Rhythm: Normal rate and regular rhythm.      Heart sounds: Normal heart sounds.   Pulmonary:      Effort: Pulmonary effort is normal.      Breath sounds: Normal breath sounds.   Musculoskeletal:         General: Normal range of motion.      Lumbar back: Normal.   Skin:     General: Skin is warm and dry.   Neurological:      Mental Status: He is alert and oriented to person, place, and time.          An electronic signature was used to authenticate this note.    --PREMA BERMUDEZ - CNP

## 2023-02-27 NOTE — PROGRESS NOTES
Patient is present for f/u from [facility] [address] ED  Patient was in the ED on 2/21 due to neck pain  Patient states the neck pain has improved, states it is still a little stiff    Patient states he is having right sciatic pain  States the pain radiates down her right leg  States it has been getting worse since Dec.

## 2023-07-07 DIAGNOSIS — I10 ESSENTIAL HYPERTENSION: ICD-10-CM

## 2023-07-07 RX ORDER — LISINOPRIL 5 MG/1
TABLET ORAL
Qty: 30 TABLET | Refills: 2 | Status: SHIPPED | OUTPATIENT
Start: 2023-07-07 | End: 2023-10-05

## 2023-07-07 RX ORDER — HYDROCHLOROTHIAZIDE 12.5 MG/1
TABLET ORAL
Qty: 30 TABLET | Refills: 2 | Status: SHIPPED | OUTPATIENT
Start: 2023-07-07

## 2023-12-14 ENCOUNTER — TELEMEDICINE (OUTPATIENT)
Dept: FAMILY MEDICINE CLINIC | Age: 58
End: 2023-12-14
Payer: COMMERCIAL

## 2023-12-14 DIAGNOSIS — I10 ESSENTIAL HYPERTENSION: ICD-10-CM

## 2023-12-14 DIAGNOSIS — Z12.5 SCREENING FOR MALIGNANT NEOPLASM OF PROSTATE: ICD-10-CM

## 2023-12-14 DIAGNOSIS — M54.31 SCIATICA, RIGHT SIDE: ICD-10-CM

## 2023-12-14 DIAGNOSIS — Z13.220 SCREENING, LIPID: ICD-10-CM

## 2023-12-14 DIAGNOSIS — Z13.21 ENCOUNTER FOR VITAMIN DEFICIENCY SCREENING: ICD-10-CM

## 2023-12-14 DIAGNOSIS — Z13.29 SCREENING FOR THYROID DISORDER: Primary | ICD-10-CM

## 2023-12-14 PROCEDURE — 99214 OFFICE O/P EST MOD 30 MIN: CPT | Performed by: NURSE PRACTITIONER

## 2023-12-14 RX ORDER — HYDROCHLOROTHIAZIDE 12.5 MG/1
12.5 TABLET ORAL DAILY
Qty: 30 TABLET | Refills: 5 | Status: SHIPPED | OUTPATIENT
Start: 2023-12-14

## 2023-12-14 RX ORDER — LISINOPRIL 5 MG/1
5 TABLET ORAL DAILY
Qty: 30 TABLET | Refills: 5 | Status: SHIPPED | OUTPATIENT
Start: 2023-12-14 | End: 2024-05-22

## 2023-12-14 RX ORDER — MELOXICAM 7.5 MG/1
7.5 TABLET ORAL DAILY PRN
Qty: 30 TABLET | Refills: 5 | Status: SHIPPED | OUTPATIENT
Start: 2023-12-14

## 2023-12-14 NOTE — PROGRESS NOTES
Jeremías Dixon, was evaluated through a synchronous (real-time) audio-video encounter. The patient (or guardian if applicable) is aware that this is a billable service, which includes applicable co-pays. This Virtual Visit was conducted with patient's (and/or legal guardian's) consent. Patient identification was verified, and a caregiver was present when appropriate. The patient was located at Home: [address] [place] [id number]  Provider was located at Home ([address] [facility]): Hernan Reeder (:  1965) is a Established patient, presenting virtually for evaluation of the following:    Assessment & Plan   Below is the assessment and plan developed based on review of pertinent history, physical exam, labs, studies, and medications. 1. Screening for thyroid disorder  -     TSH with Reflex; Future  2. Sciatica, right side  -     meloxicam (MOBIC) 7.5 MG tablet; Take 1 tablet by mouth daily as needed for Pain, Disp-30 tablet, R-5Normal  3. Essential hypertension  -     CBC with Auto Differential; Future  -     Comprehensive Metabolic Panel; Future  -     lisinopril (PRINIVIL;ZESTRIL) 5 MG tablet; Take 1 tablet by mouth daily, Disp-30 tablet, R-5Normal  -     hydroCHLOROthiazide (HYDRODIURIL) 12.5 MG tablet; Take 1 tablet by mouth daily, Disp-30 tablet, R-5Normal  4. Encounter for vitamin deficiency screening  -     Vitamin D 25 Hydroxy; Future  5. Screening for malignant neoplasm of prostate  -     PSA Screening; Future  6. Screening, lipid  -     Lipid Panel; Future    No follow-ups on file. Subjective   Hypertension  Pertinent negatives include no chest pain or shortness of breath. Medication Refill  Pertinent negatives include no chest pain, chills, coughing or fever. States he had his bp checked on Wednesday and it was 138/80  Reminded about labs       Review of Systems   Constitutional:  Negative for chills and fever.    Respiratory:  Negative for cough and shortness of
Patient is doing a VV for a follow up on HTN, cholesterol and med refills  He is not taking Atorvastatin due to causing leg cramps but is wanting something for cholesterol.   Labs and refills are pended
Resident

## 2023-12-18 DIAGNOSIS — I10 ESSENTIAL HYPERTENSION: ICD-10-CM

## 2023-12-18 RX ORDER — LISINOPRIL 5 MG/1
5 TABLET ORAL DAILY
Qty: 30 TABLET | Refills: 5 | OUTPATIENT
Start: 2023-12-18

## 2024-01-03 ENCOUNTER — HOSPITAL ENCOUNTER (OUTPATIENT)
Age: 59
Setting detail: SPECIMEN
Discharge: HOME | End: 2024-01-03

## 2024-01-03 DIAGNOSIS — Z13.29 SCREENING FOR THYROID DISORDER: ICD-10-CM

## 2024-01-03 DIAGNOSIS — I10 ESSENTIAL HYPERTENSION: ICD-10-CM

## 2024-01-03 DIAGNOSIS — Z12.5 SCREENING FOR MALIGNANT NEOPLASM OF PROSTATE: ICD-10-CM

## 2024-01-03 DIAGNOSIS — Z13.220 SCREENING, LIPID: ICD-10-CM

## 2024-01-03 DIAGNOSIS — Z13.21 ENCOUNTER FOR VITAMIN DEFICIENCY SCREENING: ICD-10-CM

## 2024-01-03 LAB
25(OH)D3 SERPL-MCNC: 30.8 NG/ML
ALBUMIN SERPL-MCNC: 4.3 G/DL (ref 3.5–5.2)
ALBUMIN/GLOB SERPL: 1.9 {RATIO} (ref 1–2.5)
ALP SERPL-CCNC: 50 U/L (ref 40–129)
ALT SERPL-CCNC: 26 U/L (ref 5–41)
ANION GAP SERPL CALCULATED.3IONS-SCNC: 11 MMOL/L (ref 9–17)
AST SERPL-CCNC: 23 U/L
BASOPHILS # BLD: 0.05 K/UL (ref 0–0.2)
BASOPHILS NFR BLD: 1 % (ref 0–2)
BILIRUB SERPL-MCNC: 0.4 MG/DL (ref 0.3–1.2)
BUN SERPL-MCNC: 22 MG/DL (ref 6–20)
CALCIUM SERPL-MCNC: 9.1 MG/DL (ref 8.6–10.4)
CHLORIDE SERPL-SCNC: 99 MMOL/L (ref 98–107)
CHOLEST SERPL-MCNC: 213 MG/DL
CHOLESTEROL/HDL RATIO: 4.2
CO2 SERPL-SCNC: 25 MMOL/L (ref 20–31)
CREAT SERPL-MCNC: 0.8 MG/DL (ref 0.7–1.2)
EOSINOPHIL # BLD: 0.18 K/UL (ref 0–0.44)
EOSINOPHILS RELATIVE PERCENT: 3 % (ref 1–4)
ERYTHROCYTE [DISTWIDTH] IN BLOOD BY AUTOMATED COUNT: 11.8 % (ref 11.8–14.4)
GFR SERPL CREATININE-BSD FRML MDRD: >60 ML/MIN/1.73M2
GLUCOSE SERPL-MCNC: 105 MG/DL (ref 70–99)
HCT VFR BLD AUTO: 43.6 % (ref 40.7–50.3)
HDLC SERPL-MCNC: 51 MG/DL
HGB BLD-MCNC: 15 G/DL (ref 13–17)
IMM GRANULOCYTES # BLD AUTO: <0.03 K/UL (ref 0–0.3)
IMM GRANULOCYTES NFR BLD: 0 %
LDLC SERPL CALC-MCNC: 133 MG/DL (ref 0–130)
LYMPHOCYTES NFR BLD: 1.66 K/UL (ref 1.1–3.7)
LYMPHOCYTES RELATIVE PERCENT: 26 % (ref 24–43)
MCH RBC QN AUTO: 32.9 PG (ref 25.2–33.5)
MCHC RBC AUTO-ENTMCNC: 34.4 G/DL (ref 28.4–34.8)
MCV RBC AUTO: 95.6 FL (ref 82.6–102.9)
MONOCYTES NFR BLD: 0.47 K/UL (ref 0.1–1.2)
MONOCYTES NFR BLD: 7 % (ref 3–12)
NEUTROPHILS NFR BLD: 63 % (ref 36–65)
NEUTS SEG NFR BLD: 4.01 K/UL (ref 1.5–8.1)
NRBC BLD-RTO: 0 PER 100 WBC
PLATELET # BLD AUTO: 200 K/UL (ref 138–453)
PMV BLD AUTO: 10 FL (ref 8.1–13.5)
POTASSIUM SERPL-SCNC: 4.3 MMOL/L (ref 3.7–5.3)
PROT SERPL-MCNC: 6.6 G/DL (ref 6.4–8.3)
PSA SERPL-MCNC: 1.02 NG/ML
RBC # BLD AUTO: 4.56 M/UL (ref 4.21–5.77)
SODIUM SERPL-SCNC: 135 MMOL/L (ref 135–144)
TRIGL SERPL-MCNC: 143 MG/DL
TSH SERPL DL<=0.05 MIU/L-ACNC: 8.15 UIU/ML (ref 0.3–5)
WBC OTHER # BLD: 6.4 K/UL (ref 3.5–11.3)

## 2024-01-04 LAB — T4 FREE SERPL-MCNC: 1 NG/DL (ref 0.9–1.7)

## 2024-01-04 NOTE — RESULT ENCOUNTER NOTE
Is thyroid is under performing, need to start supplementing and repeat in 6 weeks.  Any signs of hypothyroid?  Cholesterol levels and risk of having a cardiovascular event have worsened,willing to try a different med?    The 10-year ASCVD risk score (Ariella ACEVEDO, et al., 2019) is: 14.5%    Values used to calculate the score:      Age: 58 years      Sex: Male      Is Non- : No      Diabetic: No      Tobacco smoker: Yes      Systolic Blood Pressure: 130 mmHg      Is BP treated: Yes      HDL Cholesterol: 51 mg/dL      Total Cholesterol: 213 mg/dL

## 2024-01-08 DIAGNOSIS — E78.2 MIXED HYPERLIPIDEMIA: ICD-10-CM

## 2024-01-08 DIAGNOSIS — I25.10 ASCVD (ARTERIOSCLEROTIC CARDIOVASCULAR DISEASE): Primary | ICD-10-CM

## 2024-01-08 DIAGNOSIS — E03.9 HYPOTHYROIDISM, UNSPECIFIED TYPE: ICD-10-CM

## 2024-01-08 RX ORDER — ROSUVASTATIN CALCIUM 5 MG/1
5 TABLET, COATED ORAL DAILY
Qty: 90 TABLET | Refills: 1 | Status: SHIPPED | OUTPATIENT
Start: 2024-01-08

## 2024-01-08 RX ORDER — LEVOTHYROXINE SODIUM 0.03 MG/1
25 TABLET ORAL DAILY
Qty: 90 TABLET | Refills: 1 | Status: SHIPPED | OUTPATIENT
Start: 2024-01-08

## 2024-02-23 ENCOUNTER — HOSPITAL ENCOUNTER (OUTPATIENT)
Age: 59
Setting detail: SPECIMEN
Discharge: HOME | End: 2024-02-23

## 2024-02-23 DIAGNOSIS — E03.9 HYPOTHYROIDISM, UNSPECIFIED TYPE: ICD-10-CM

## 2024-02-23 LAB — TSH SERPL DL<=0.05 MIU/L-ACNC: 3.07 UIU/ML (ref 0.27–4.2)

## 2024-05-10 ENCOUNTER — TELEPHONE (OUTPATIENT)
Dept: FAMILY MEDICINE CLINIC | Age: 59
End: 2024-05-10

## 2024-05-17 ENCOUNTER — TELEPHONE (OUTPATIENT)
Dept: GASTROENTEROLOGY | Age: 59
End: 2024-05-17

## 2024-05-17 ENCOUNTER — OFFICE VISIT (OUTPATIENT)
Dept: FAMILY MEDICINE CLINIC | Age: 59
End: 2024-05-17
Payer: COMMERCIAL

## 2024-05-17 VITALS
DIASTOLIC BLOOD PRESSURE: 84 MMHG | OXYGEN SATURATION: 98 % | SYSTOLIC BLOOD PRESSURE: 124 MMHG | HEART RATE: 60 BPM | BODY MASS INDEX: 29.52 KG/M2 | WEIGHT: 230 LBS | HEIGHT: 74 IN

## 2024-05-17 DIAGNOSIS — Z12.12 ENCOUNTER FOR SCREENING FOR COLORECTAL MALIGNANT NEOPLASM: ICD-10-CM

## 2024-05-17 DIAGNOSIS — I10 ESSENTIAL HYPERTENSION: Primary | ICD-10-CM

## 2024-05-17 DIAGNOSIS — Z12.11 ENCOUNTER FOR SCREENING FOR COLORECTAL MALIGNANT NEOPLASM: ICD-10-CM

## 2024-05-17 DIAGNOSIS — E03.9 HYPOTHYROIDISM, UNSPECIFIED TYPE: ICD-10-CM

## 2024-05-17 DIAGNOSIS — E78.2 MIXED HYPERLIPIDEMIA: ICD-10-CM

## 2024-05-17 PROCEDURE — 3079F DIAST BP 80-89 MM HG: CPT | Performed by: NURSE PRACTITIONER

## 2024-05-17 PROCEDURE — 99214 OFFICE O/P EST MOD 30 MIN: CPT | Performed by: NURSE PRACTITIONER

## 2024-05-17 PROCEDURE — 3074F SYST BP LT 130 MM HG: CPT | Performed by: NURSE PRACTITIONER

## 2024-05-17 RX ORDER — LISINOPRIL 5 MG/1
5 TABLET ORAL DAILY
Qty: 90 TABLET | Refills: 1 | Status: SHIPPED | OUTPATIENT
Start: 2024-05-17 | End: 2024-10-24

## 2024-05-17 RX ORDER — LEVOTHYROXINE SODIUM 0.03 MG/1
25 TABLET ORAL DAILY
Qty: 90 TABLET | Refills: 1 | Status: SHIPPED | OUTPATIENT
Start: 2024-05-17

## 2024-05-17 RX ORDER — HYDROCHLOROTHIAZIDE 12.5 MG/1
12.5 TABLET ORAL DAILY
Qty: 90 TABLET | Refills: 1 | Status: SHIPPED | OUTPATIENT
Start: 2024-05-17

## 2024-05-17 RX ORDER — ROSUVASTATIN CALCIUM 5 MG/1
5 TABLET, COATED ORAL DAILY
Qty: 90 TABLET | Refills: 1 | Status: SHIPPED | OUTPATIENT
Start: 2024-05-17

## 2024-05-17 SDOH — ECONOMIC STABILITY: FOOD INSECURITY: WITHIN THE PAST 12 MONTHS, THE FOOD YOU BOUGHT JUST DIDN'T LAST AND YOU DIDN'T HAVE MONEY TO GET MORE.: NEVER TRUE

## 2024-05-17 SDOH — ECONOMIC STABILITY: INCOME INSECURITY: HOW HARD IS IT FOR YOU TO PAY FOR THE VERY BASICS LIKE FOOD, HOUSING, MEDICAL CARE, AND HEATING?: NOT HARD AT ALL

## 2024-05-17 SDOH — ECONOMIC STABILITY: FOOD INSECURITY: WITHIN THE PAST 12 MONTHS, YOU WORRIED THAT YOUR FOOD WOULD RUN OUT BEFORE YOU GOT MONEY TO BUY MORE.: NEVER TRUE

## 2024-05-17 ASSESSMENT — PATIENT HEALTH QUESTIONNAIRE - PHQ9
SUM OF ALL RESPONSES TO PHQ QUESTIONS 1-9: 0
SUM OF ALL RESPONSES TO PHQ9 QUESTIONS 1 & 2: 0
SUM OF ALL RESPONSES TO PHQ QUESTIONS 1-9: 0
2. FEELING DOWN, DEPRESSED OR HOPELESS: NOT AT ALL
SUM OF ALL RESPONSES TO PHQ QUESTIONS 1-9: 0
SUM OF ALL RESPONSES TO PHQ QUESTIONS 1-9: 0
1. LITTLE INTEREST OR PLEASURE IN DOING THINGS: NOT AT ALL

## 2024-05-17 ASSESSMENT — ENCOUNTER SYMPTOMS
COUGH: 0
SHORTNESS OF BREATH: 0

## 2024-05-17 NOTE — TELEPHONE ENCOUNTER
Attempt 1, writer spoke with pt to schedule colonoscopy/[facility]. Pt said he is going to have to wait a while and will call in a few months to schedule.    Writer will send pt colon letter/screening questionnaire in mail as a reminder to call office to schedule.

## 2024-05-17 NOTE — PROGRESS NOTES
Venkat Vernon (:  1965) is a 59 y.o. male,Established patient, here for evaluation of the following chief complaint(s):  Hypertension and 6 Month Follow-Up      Assessment & Plan   ASSESSMENT/PLAN:  1. Essential hypertension  -     hydroCHLOROthiazide 12.5 MG tablet; Take 1 tablet by mouth daily, Disp-90 tablet, R-1Normal  -     lisinopril (PRINIVIL;ZESTRIL) 5 MG tablet; Take 1 tablet by mouth daily, Disp-90 tablet, R-1Normal  2. Hypothyroidism, unspecified type  -     levothyroxine (SYNTHROID) 25 MCG tablet; Take 1 tablet by mouth daily, Disp-90 tablet, R-1Normal  3. Mixed hyperlipidemia  -     rosuvastatin (CRESTOR) 5 MG tablet; Take 1 tablet by mouth daily, Disp-90 tablet, R-1Normal  4. Encounter for screening for colorectal malignant neoplasm  -     Oliva Cisse MD, Gastroenterology, [facility]      No follow-ups on file.         Subjective   SUBJECTIVE/OBJECTIVE:  Hypertension  Pertinent negatives include no chest pain or shortness of breath.       Review of Systems   Constitutional:  Negative for chills and fever.   Respiratory:  Negative for cough and shortness of breath.    Cardiovascular:  Negative for chest pain and leg swelling.          Objective   Vitals:    24 1159   BP: 124/84   Pulse:    SpO2:      Wt Readings from Last 3 Encounters:   24 104.3 kg (230 lb)   23 106.1 kg (234 lb)   23 104.3 kg (230 lb)     Lab Results   Component Value Date    WBC 6.4 2024    HGB 15.0 2024    HCT 43.6 2024    MCV 95.6 2024     2024     Lab Results   Component Value Date     2024    K 4.3 2024    CL 99 2024    CO2 25 2024    BUN 22 (H) 2024    CREATININE 0.8 2024    GLUCOSE 105 (H) 2024    CALCIUM 9.1 2024    BILITOT 0.4 2024    ALKPHOS 50 2024    AST 23 2024    ALT 26 2024    LABGLOM >60 2024    GFRAA >60 2022           Physical

## 2024-06-20 ENCOUNTER — HOSPITAL ENCOUNTER (EMERGENCY)
Age: 59
Discharge: HOME | End: 2024-06-20
Attending: EMERGENCY MEDICINE
Payer: COMMERCIAL

## 2024-06-20 ENCOUNTER — APPOINTMENT (OUTPATIENT)
Dept: CT IMAGING | Age: 59
End: 2024-06-20
Payer: COMMERCIAL

## 2024-06-20 VITALS
BODY MASS INDEX: 28.88 KG/M2 | HEIGHT: 74 IN | TEMPERATURE: 98 F | OXYGEN SATURATION: 98 % | RESPIRATION RATE: 18 BRPM | DIASTOLIC BLOOD PRESSURE: 91 MMHG | HEART RATE: 69 BPM | SYSTOLIC BLOOD PRESSURE: 152 MMHG | WEIGHT: 225 LBS

## 2024-06-20 DIAGNOSIS — R10.9 FLANK PAIN: Primary | ICD-10-CM

## 2024-06-20 LAB
ALBUMIN SERPL-MCNC: 4.4 G/DL (ref 3.5–5.2)
ALP SERPL-CCNC: 55 U/L (ref 40–129)
ALT SERPL-CCNC: 23 U/L (ref 5–41)
ANION GAP SERPL CALCULATED.3IONS-SCNC: 13 MMOL/L (ref 9–17)
AST SERPL-CCNC: 19 U/L
BASOPHILS # BLD: 0.06 K/UL (ref 0–0.2)
BASOPHILS NFR BLD: 1 % (ref 0–2)
BILIRUB DIRECT SERPL-MCNC: 0.1 MG/DL
BILIRUB INDIRECT SERPL-MCNC: 0.3 MG/DL (ref 0–1)
BILIRUB SERPL-MCNC: 0.4 MG/DL (ref 0.3–1.2)
BILIRUB UR QL STRIP: NEGATIVE
BUN SERPL-MCNC: 24 MG/DL (ref 6–20)
BUN/CREAT SERPL: 30 (ref 9–20)
CALCIUM SERPL-MCNC: 9.1 MG/DL (ref 8.6–10.4)
CHLORIDE SERPL-SCNC: 101 MMOL/L (ref 98–107)
CLARITY UR: CLEAR
CO2 SERPL-SCNC: 25 MMOL/L (ref 20–31)
COLOR UR: YELLOW
CREAT SERPL-MCNC: 0.8 MG/DL (ref 0.7–1.2)
EOSINOPHIL # BLD: 0.14 K/UL (ref 0–0.44)
EOSINOPHILS RELATIVE PERCENT: 2 % (ref 1–4)
EPI CELLS #/AREA URNS HPF: ABNORMAL /HPF (ref 0–5)
ERYTHROCYTE [DISTWIDTH] IN BLOOD BY AUTOMATED COUNT: 11.9 % (ref 11.8–14.4)
GFR, ESTIMATED: >90 ML/MIN/1.73M2
GLUCOSE SERPL-MCNC: 124 MG/DL (ref 70–99)
GLUCOSE UR STRIP-MCNC: NEGATIVE MG/DL
HCT VFR BLD AUTO: 42.5 % (ref 40.7–50.3)
HGB BLD-MCNC: 14.7 G/DL (ref 13–17)
HGB UR QL STRIP.AUTO: NEGATIVE
IMM GRANULOCYTES # BLD AUTO: 0.02 K/UL (ref 0–0.3)
IMM GRANULOCYTES NFR BLD: 0 %
KETONES UR STRIP-MCNC: NEGATIVE MG/DL
LEUKOCYTE ESTERASE UR QL STRIP: NEGATIVE
LIPASE SERPL-CCNC: 35 U/L (ref 13–60)
LYMPHOCYTES NFR BLD: 1.74 K/UL (ref 1.1–3.7)
LYMPHOCYTES RELATIVE PERCENT: 19 % (ref 24–43)
MAGNESIUM SERPL-MCNC: 2.1 MG/DL (ref 1.6–2.6)
MCH RBC QN AUTO: 32.2 PG (ref 25.2–33.5)
MCHC RBC AUTO-ENTMCNC: 34.6 G/DL (ref 28.4–34.8)
MCV RBC AUTO: 93.2 FL (ref 82.6–102.9)
MONOCYTES NFR BLD: 0.67 K/UL (ref 0.1–1.2)
MONOCYTES NFR BLD: 7 % (ref 3–12)
MUCOUS THREADS URNS QL MICRO: ABNORMAL
NEUTROPHILS NFR BLD: 71 % (ref 36–65)
NEUTS SEG NFR BLD: 6.51 K/UL (ref 1.5–8.1)
NITRITE UR QL STRIP: NEGATIVE
NRBC BLD-RTO: 0 PER 100 WBC
PH UR STRIP: 6 [PH] (ref 5–8)
PLATELET # BLD AUTO: 198 K/UL (ref 138–453)
PMV BLD AUTO: 8.9 FL (ref 8.1–13.5)
POTASSIUM SERPL-SCNC: 4.1 MMOL/L (ref 3.7–5.3)
PROT SERPL-MCNC: 6.9 G/DL (ref 6.4–8.3)
PROT UR STRIP-MCNC: NEGATIVE MG/DL
RBC # BLD AUTO: 4.56 M/UL (ref 4.21–5.77)
RBC #/AREA URNS HPF: ABNORMAL /HPF (ref 0–2)
SODIUM SERPL-SCNC: 139 MMOL/L (ref 135–144)
SP GR UR STRIP: 1.04 (ref 1–1.03)
UROBILINOGEN UR STRIP-ACNC: NORMAL EU/DL (ref 0–1)
WBC #/AREA URNS HPF: ABNORMAL /HPF (ref 0–5)
WBC OTHER # BLD: 9.1 K/UL (ref 3.5–11.3)

## 2024-06-20 PROCEDURE — 80076 HEPATIC FUNCTION PANEL: CPT

## 2024-06-20 PROCEDURE — 6370000000 HC RX 637 (ALT 250 FOR IP): Performed by: NURSE PRACTITIONER

## 2024-06-20 PROCEDURE — 81001 URINALYSIS AUTO W/SCOPE: CPT

## 2024-06-20 PROCEDURE — 83735 ASSAY OF MAGNESIUM: CPT

## 2024-06-20 PROCEDURE — 96374 THER/PROPH/DIAG INJ IV PUSH: CPT

## 2024-06-20 PROCEDURE — 85025 COMPLETE CBC W/AUTO DIFF WBC: CPT

## 2024-06-20 PROCEDURE — 2580000003 HC RX 258: Performed by: NURSE PRACTITIONER

## 2024-06-20 PROCEDURE — 74176 CT ABD & PELVIS W/O CONTRAST: CPT

## 2024-06-20 PROCEDURE — 99284 EMERGENCY DEPT VISIT MOD MDM: CPT

## 2024-06-20 PROCEDURE — 80048 BASIC METABOLIC PNL TOTAL CA: CPT

## 2024-06-20 PROCEDURE — 6360000002 HC RX W HCPCS: Performed by: NURSE PRACTITIONER

## 2024-06-20 PROCEDURE — 83690 ASSAY OF LIPASE: CPT

## 2024-06-20 RX ORDER — SODIUM CHLORIDE 9 MG/ML
INJECTION, SOLUTION INTRAVENOUS CONTINUOUS
Status: DISCONTINUED | OUTPATIENT
Start: 2024-06-20 | End: 2024-06-20 | Stop reason: HOSPADM

## 2024-06-20 RX ORDER — METAXALONE 800 MG/1
800 TABLET ORAL ONCE
Status: COMPLETED | OUTPATIENT
Start: 2024-06-20 | End: 2024-06-20

## 2024-06-20 RX ORDER — TIZANIDINE 2 MG/1
2 TABLET ORAL EVERY 8 HOURS PRN
Qty: 15 TABLET | Refills: 0 | Status: SHIPPED | OUTPATIENT
Start: 2024-06-20

## 2024-06-20 RX ORDER — KETOROLAC TROMETHAMINE 15 MG/ML
15 INJECTION, SOLUTION INTRAMUSCULAR; INTRAVENOUS ONCE
Status: COMPLETED | OUTPATIENT
Start: 2024-06-20 | End: 2024-06-20

## 2024-06-20 RX ORDER — PREDNISONE 20 MG/1
40 TABLET ORAL DAILY
Qty: 10 TABLET | Refills: 0 | Status: SHIPPED | OUTPATIENT
Start: 2024-06-20 | End: 2024-06-25

## 2024-06-20 RX ORDER — HYDROCODONE BITARTRATE AND ACETAMINOPHEN 5; 325 MG/1; MG/1
1 TABLET ORAL EVERY 8 HOURS PRN
Qty: 9 TABLET | Refills: 0 | Status: SHIPPED | OUTPATIENT
Start: 2024-06-20 | End: 2024-06-23

## 2024-06-20 RX ADMIN — SODIUM CHLORIDE: 9 INJECTION, SOLUTION INTRAVENOUS at 09:23

## 2024-06-20 RX ADMIN — METAXALONE 800 MG: 800 TABLET ORAL at 10:03

## 2024-06-20 RX ADMIN — KETOROLAC TROMETHAMINE 15 MG: 15 INJECTION, SOLUTION INTRAMUSCULAR; INTRAVENOUS at 10:04

## 2024-06-20 ASSESSMENT — PAIN DESCRIPTION - DESCRIPTORS: DESCRIPTORS: SHARP

## 2024-06-20 ASSESSMENT — PAIN - FUNCTIONAL ASSESSMENT: PAIN_FUNCTIONAL_ASSESSMENT: 0-10

## 2024-06-20 ASSESSMENT — ENCOUNTER SYMPTOMS
DIARRHEA: 0
VOMITING: 0
SHORTNESS OF BREATH: 0
NAUSEA: 0
BACK PAIN: 1
ABDOMINAL PAIN: 1
COLOR CHANGE: 0

## 2024-06-20 ASSESSMENT — PAIN DESCRIPTION - ORIENTATION: ORIENTATION: LEFT

## 2024-06-20 ASSESSMENT — LIFESTYLE VARIABLES
HOW MANY STANDARD DRINKS CONTAINING ALCOHOL DO YOU HAVE ON A TYPICAL DAY: 1 OR 2
HOW OFTEN DO YOU HAVE A DRINK CONTAINING ALCOHOL: MONTHLY OR LESS

## 2024-06-20 ASSESSMENT — PAIN SCALES - GENERAL
PAINLEVEL_OUTOF10: 3
PAINLEVEL_OUTOF10: 8

## 2024-06-20 ASSESSMENT — PAIN DESCRIPTION - LOCATION: LOCATION: FLANK

## 2024-06-20 NOTE — ED TRIAGE NOTES
Patient comes in with left-sided flank pain that radiates to his back, started yesterday. Also having c/o low back pain and testicle pain. Here for eval.

## 2024-06-20 NOTE — ED PROVIDER NOTES
[facility] ED  eMERGENCY dEPARTMENT eNCOUnter      Pt Name: Venkat Vernon  MRN: [id number]  Birthdate 1965  Date of evaluation: 6/20/2024  Provider: PREAM Childress CNP    CHIEF COMPLAINT       Chief Complaint   Patient presents with    Flank Pain     Left side radiating to back.     Back Pain    Testicle Pain         HISTORY OF PRESENT ILLNESS  (Location/Symptom, Timing/Onset, Context/Setting, Quality, Duration, Modifying Factors, Severity.)   Venkat Vernon is a 59 y.o. male who presents to the emergency department. C/o left flank pain that wraps around to the left abdomen and groin. Onset was yesterday. Denies fever, chills, N/V/D, injury. Denies urinary sx. Rates his pain 8/10.      Nursing Notes were reviewed.    ALLERGIES     Penicillins    CURRENT MEDICATIONS       Previous Medications    ASPIRIN 81 MG EC TABLET    Take 1 tablet by mouth daily Pt states he takes 1 tablet every other day    HYDROCHLOROTHIAZIDE 12.5 MG TABLET    Take 1 tablet by mouth daily    LEVOTHYROXINE (SYNTHROID) 25 MCG TABLET    Take 1 tablet by mouth daily    LISINOPRIL (PRINIVIL;ZESTRIL) 5 MG TABLET    Take 1 tablet by mouth daily    MELOXICAM (MOBIC) 7.5 MG TABLET    Take 1 tablet by mouth daily as needed for Pain    MULTIPLE VITAMINS-MINERALS (THERAPEUTIC MULTIVITAMIN-MINERALS) TABLET    Take 1 tablet by mouth daily    ROSUVASTATIN (CRESTOR) 5 MG TABLET    Take 1 tablet by mouth daily    TIZANIDINE (ZANAFLEX) 4 MG TABLET    Take 1 tablet by mouth every 8 hours as needed (back pain)       PAST MEDICAL HISTORY         Diagnosis Date    Diverticulosis     History of colonoscopy 01/29/2019    hyperplastic polyp, tubular adenoma    Hypertension     Inguinal hernia     right    Wears dentures     upper       SURGICAL HISTORY           Procedure Laterality Date    BACK SURGERY      COLONOSCOPY N/A 01/29/2019    hyperplastic polyp, tubular adenoma    HERNIA REPAIR Right 03/13/2019    HERNIA INGUINAL REPAIR 
packs/day for 45.0 years (11.3 ttl pk-yrs)     Types: Cigarettes    Smokeless tobacco: Former   Vaping Use    Vaping Use: Never used   Substance Use Topics    Alcohol use: Yes     Comment: occassional ETOH use    Drug use: Yes     Types: Marijuana (Weed)     Comment: occassional          Marycarmen Rojas MD  Attending Emergency Physician          Marycarmen Rojas MD  06/20/24 [id number]

## 2024-12-12 ENCOUNTER — TELEPHONE (OUTPATIENT)
Dept: ADMINISTRATIVE | Age: 59
End: 2024-12-12

## 2024-12-12 NOTE — TELEPHONE ENCOUNTER
Patient trying to get his colonoscopy scheduled with Dr Perez. Referral in system since March but awaiting until he was laid off with work. Patient is asking for the [facility] location if poss but will also take the first available  Please call to schedule

## 2024-12-13 ENCOUNTER — PREP FOR PROCEDURE (OUTPATIENT)
Dept: GASTROENTEROLOGY | Age: 59
End: 2024-12-13

## 2024-12-13 DIAGNOSIS — Z12.11 ENCOUNTER FOR SCREENING FOR COLORECTAL MALIGNANT NEOPLASM: ICD-10-CM

## 2024-12-13 DIAGNOSIS — Z12.12 ENCOUNTER FOR SCREENING FOR COLORECTAL MALIGNANT NEOPLASM: ICD-10-CM

## 2024-12-13 NOTE — TELEPHONE ENCOUNTER
Pt's procedure has been scheduled. Please see telephone encounter from 05/17/24 for procedure scheduling.

## 2024-12-15 DIAGNOSIS — I10 ESSENTIAL HYPERTENSION: ICD-10-CM

## 2024-12-15 DIAGNOSIS — E03.9 HYPOTHYROIDISM, UNSPECIFIED TYPE: ICD-10-CM

## 2024-12-16 RX ORDER — LISINOPRIL 5 MG/1
5 TABLET ORAL DAILY
Qty: 90 TABLET | Refills: 1 | Status: SHIPPED | OUTPATIENT
Start: 2024-12-16 | End: 2025-06-14

## 2024-12-16 RX ORDER — LEVOTHYROXINE SODIUM 25 UG/1
25 TABLET ORAL DAILY
Qty: 90 TABLET | Refills: 1 | Status: SHIPPED | OUTPATIENT
Start: 2024-12-16

## 2024-12-16 RX ORDER — HYDROCHLOROTHIAZIDE 12.5 MG/1
12.5 TABLET ORAL DAILY
Qty: 90 TABLET | Refills: 1 | Status: SHIPPED | OUTPATIENT
Start: 2024-12-16

## 2024-12-16 NOTE — TELEPHONE ENCOUNTER
Last visit: 5/17/24  Last Med refill: 5/17/24  Does patient have enough medication for 72 hours: No:     Next Visit Date:  Future Appointments   Date Time Provider Department Center   1/3/2025  8:45 AM Tom Estrella, APRN - CNP [facility] DE[facility]       Health Maintenance   Topic Date Due    Hepatitis C screen  Never done    Hepatitis B vaccine (1 of 3 - 19+ 3-dose series) Never done    Shingles vaccine (1 of 2) Never done    Colorectal Cancer Screen  01/29/2024    Flu vaccine (1) 08/01/2024    COVID-19 Vaccine (4 - 2023-24 season) 09/01/2024    Lipids  01/03/2025    Pneumococcal 0-64 years Vaccine (2 of 2 - PCV) 05/17/2025 (Originally 1/31/2020)    Depression Screen  05/17/2025    Diabetes screen  11/18/2025    DTaP/Tdap/Td vaccine (2 - Td or Tdap) 09/25/2027    Hepatitis A vaccine  Aged Out    Hib vaccine  Aged Out    Polio vaccine  Aged Out    Meningococcal (ACWY) vaccine  Aged Out    HIV screen  Discontinued    Prostate Specific Antigen (PSA) Screening or Monitoring  Discontinued       Hemoglobin A1C (%)   Date Value   11/18/2022 5.1   03/07/2019 5.5             ( goal A1C is < 7)   No components found for: \"LABMICR\"  No components found for: \"LDLCHOLESTEROL\", \"LDLCALC\"    (goal LDL is <100)   AST (U/L)   Date Value   06/20/2024 19     ALT (U/L)   Date Value   06/20/2024 23     BUN (mg/dL)   Date Value   06/20/2024 24 (H)     BP Readings from Last 3 Encounters:   06/20/24 (!) 152/91   05/17/24 124/84   02/27/23 130/80          (goal 120/80)    All Future Testing planned in [facility]  Lab Frequency Next Occurrence               Patient Active Problem List:     Essential hypertension     Class 1 obesity due to excess calories with serious comorbidity and body mass index (BMI) of 30.0 to 30.9 in adult     Diverticulosis of large intestine without hemorrhage     Status post right inguinal hernia repair     Closed displaced fracture of second cervical vertebra (HCC)     Encounter for screening for

## 2025-01-03 ENCOUNTER — OFFICE VISIT (OUTPATIENT)
Dept: FAMILY MEDICINE CLINIC | Age: 60
End: 2025-01-03

## 2025-01-03 VITALS
WEIGHT: 237 LBS | HEART RATE: 60 BPM | BODY MASS INDEX: 30.43 KG/M2 | DIASTOLIC BLOOD PRESSURE: 86 MMHG | SYSTOLIC BLOOD PRESSURE: 136 MMHG | OXYGEN SATURATION: 98 %

## 2025-01-03 DIAGNOSIS — M54.31 SCIATICA, RIGHT SIDE: ICD-10-CM

## 2025-01-03 DIAGNOSIS — E78.2 MIXED HYPERLIPIDEMIA: ICD-10-CM

## 2025-01-03 RX ORDER — MELOXICAM 7.5 MG/1
7.5 TABLET ORAL DAILY PRN
Qty: 90 TABLET | Refills: 1 | Status: SHIPPED | OUTPATIENT
Start: 2025-01-03

## 2025-01-03 RX ORDER — ROSUVASTATIN CALCIUM 5 MG/1
5 TABLET, COATED ORAL DAILY
Qty: 90 TABLET | Refills: 1 | Status: SHIPPED | OUTPATIENT
Start: 2025-01-03

## 2025-01-03 ASSESSMENT — ENCOUNTER SYMPTOMS
COUGH: 0
SHORTNESS OF BREATH: 0

## 2025-01-03 ASSESSMENT — PATIENT HEALTH QUESTIONNAIRE - PHQ9
1. LITTLE INTEREST OR PLEASURE IN DOING THINGS: NOT AT ALL
SUM OF ALL RESPONSES TO PHQ QUESTIONS 1-9: 0
SUM OF ALL RESPONSES TO PHQ9 QUESTIONS 1 & 2: 0
SUM OF ALL RESPONSES TO PHQ QUESTIONS 1-9: 0
2. FEELING DOWN, DEPRESSED OR HOPELESS: NOT AT ALL

## 2025-01-03 NOTE — PROGRESS NOTES
Venkat Vernon (:  1965) is a 59 y.o. male,Established patient, here for evaluation of the following chief complaint(s):  Hypertension and Health Maintenance (Scheduled for colonoscopy this month)         Assessment & Plan  Sciatica, right side   Chronic, at goal (stable), continue current treatment plan    Orders:    meloxicam (MOBIC) 7.5 MG tablet; Take 1 tablet by mouth daily as needed for Pain    Mixed hyperlipidemia   Chronic, at goal (stable), continue current treatment plan    Orders:    rosuvastatin (CRESTOR) 5 MG tablet; Take 1 tablet by mouth daily      No follow-ups on file.       Subjective   Hypertension  Pertinent negatives include no chest pain or shortness of breath.     Goes on the 16th for colonoscopy  Review of Systems   Constitutional:  Negative for chills and fever.   Respiratory:  Negative for cough and shortness of breath.    Cardiovascular:  Negative for chest pain and leg swelling.          Objective   Vitals:    25 0855   BP: 136/86   Pulse:    SpO2:      Wt Readings from Last 3 Encounters:   25 107.5 kg (237 lb)   24 102.1 kg (225 lb)   24 104.3 kg (230 lb)       Physical Exam  Constitutional:       General: He is not in acute distress.     Appearance: He is well-developed.   HENT:      Head: Normocephalic.      Right Ear: External ear normal.      Left Ear: External ear normal.   Cardiovascular:      Rate and Rhythm: Normal rate and regular rhythm.      Heart sounds: Normal heart sounds.   Pulmonary:      Effort: Pulmonary effort is normal.      Breath sounds: Normal breath sounds.   Musculoskeletal:         General: Normal range of motion.   Skin:     General: Skin is warm and dry.   Neurological:      Mental Status: He is alert and oriented to person, place, and time.           An electronic signature was used to authenticate this note.    --ANA LILIA MACE, APRN - CNP

## 2025-02-17 ENCOUNTER — HOSPITAL ENCOUNTER (OUTPATIENT)
Age: 60
Setting detail: OUTPATIENT SURGERY
Discharge: HOME | End: 2025-02-17
Attending: INTERNAL MEDICINE | Admitting: INTERNAL MEDICINE
Payer: COMMERCIAL

## 2025-02-17 ENCOUNTER — ANESTHESIA EVENT (OUTPATIENT)
Dept: OPERATING ROOM | Age: 60
End: 2025-02-17
Payer: COMMERCIAL

## 2025-02-17 ENCOUNTER — ANESTHESIA (OUTPATIENT)
Dept: OPERATING ROOM | Age: 60
End: 2025-02-17
Payer: COMMERCIAL

## 2025-02-17 ENCOUNTER — TELEPHONE (OUTPATIENT)
Dept: GASTROENTEROLOGY | Age: 60
End: 2025-02-17

## 2025-02-17 VITALS
OXYGEN SATURATION: 98 % | RESPIRATION RATE: 12 BRPM | WEIGHT: 234.1 LBS | TEMPERATURE: 97.7 F | HEART RATE: 50 BPM | BODY MASS INDEX: 30.04 KG/M2 | SYSTOLIC BLOOD PRESSURE: 143 MMHG | DIASTOLIC BLOOD PRESSURE: 82 MMHG | HEIGHT: 74 IN

## 2025-02-17 DIAGNOSIS — Z12.11 ENCOUNTER FOR SCREENING FOR COLORECTAL MALIGNANT NEOPLASM: ICD-10-CM

## 2025-02-17 DIAGNOSIS — Z12.12 ENCOUNTER FOR SCREENING FOR COLORECTAL MALIGNANT NEOPLASM: ICD-10-CM

## 2025-02-17 DIAGNOSIS — Z86.0100 HISTORY OF COLON POLYPS: Primary | ICD-10-CM

## 2025-02-17 PROCEDURE — 2709999900 HC NON-CHARGEABLE SUPPLY: Performed by: INTERNAL MEDICINE

## 2025-02-17 PROCEDURE — 3700000000 HC ANESTHESIA ATTENDED CARE: Performed by: INTERNAL MEDICINE

## 2025-02-17 PROCEDURE — 6360000002 HC RX W HCPCS: Performed by: NURSE ANESTHETIST, CERTIFIED REGISTERED

## 2025-02-17 PROCEDURE — 88305 TISSUE EXAM BY PATHOLOGIST: CPT

## 2025-02-17 PROCEDURE — 2580000003 HC RX 258: Performed by: ANESTHESIOLOGY

## 2025-02-17 PROCEDURE — 3609019800 HC COLONOSCOPY WITH SUBMUCOSAL INJECTION: Performed by: INTERNAL MEDICINE

## 2025-02-17 PROCEDURE — 3700000001 HC ADD 15 MINUTES (ANESTHESIA): Performed by: INTERNAL MEDICINE

## 2025-02-17 PROCEDURE — 7100000010 HC PHASE II RECOVERY - FIRST 15 MIN: Performed by: INTERNAL MEDICINE

## 2025-02-17 PROCEDURE — C1889 IMPLANT/INSERT DEVICE, NOC: HCPCS | Performed by: INTERNAL MEDICINE

## 2025-02-17 PROCEDURE — 7100000011 HC PHASE II RECOVERY - ADDTL 15 MIN: Performed by: INTERNAL MEDICINE

## 2025-02-17 PROCEDURE — 3609010600 HC COLONOSCOPY POLYPECTOMY SNARE/COLD BIOPSY: Performed by: INTERNAL MEDICINE

## 2025-02-17 RX ORDER — FENTANYL CITRATE 50 UG/ML
25 INJECTION, SOLUTION INTRAMUSCULAR; INTRAVENOUS EVERY 5 MIN PRN
Status: CANCELLED | OUTPATIENT
Start: 2025-02-17

## 2025-02-17 RX ORDER — SODIUM CHLORIDE 0.9 % (FLUSH) 0.9 %
5-40 SYRINGE (ML) INJECTION PRN
Status: CANCELLED | OUTPATIENT
Start: 2025-02-17

## 2025-02-17 RX ORDER — NALOXONE HYDROCHLORIDE 0.4 MG/ML
INJECTION, SOLUTION INTRAMUSCULAR; INTRAVENOUS; SUBCUTANEOUS PRN
Status: CANCELLED | OUTPATIENT
Start: 2025-02-17

## 2025-02-17 RX ORDER — SODIUM CHLORIDE 9 MG/ML
INJECTION, SOLUTION INTRAVENOUS CONTINUOUS
Status: DISCONTINUED | OUTPATIENT
Start: 2025-02-17 | End: 2025-02-17 | Stop reason: HOSPADM

## 2025-02-17 RX ORDER — METOCLOPRAMIDE HYDROCHLORIDE 5 MG/ML
10 INJECTION INTRAMUSCULAR; INTRAVENOUS
Status: CANCELLED | OUTPATIENT
Start: 2025-02-17 | End: 2025-02-18

## 2025-02-17 RX ORDER — SODIUM CHLORIDE 0.9 % (FLUSH) 0.9 %
5-40 SYRINGE (ML) INJECTION EVERY 12 HOURS SCHEDULED
Status: CANCELLED | OUTPATIENT
Start: 2025-02-17

## 2025-02-17 RX ORDER — LIDOCAINE HYDROCHLORIDE 10 MG/ML
1 INJECTION, SOLUTION EPIDURAL; INFILTRATION; INTRACAUDAL; PERINEURAL
Status: DISCONTINUED | OUTPATIENT
Start: 2025-02-18 | End: 2025-02-17 | Stop reason: HOSPADM

## 2025-02-17 RX ORDER — LIDOCAINE HYDROCHLORIDE 20 MG/ML
INJECTION, SOLUTION EPIDURAL; INFILTRATION; INTRACAUDAL; PERINEURAL
Status: DISCONTINUED | OUTPATIENT
Start: 2025-02-17 | End: 2025-02-17 | Stop reason: SDUPTHER

## 2025-02-17 RX ORDER — HALOPERIDOL 5 MG/ML
1 INJECTION INTRAMUSCULAR
Status: CANCELLED | OUTPATIENT
Start: 2025-02-17 | End: 2025-02-18

## 2025-02-17 RX ORDER — SODIUM CHLORIDE, SODIUM LACTATE, POTASSIUM CHLORIDE, CALCIUM CHLORIDE 600; 310; 30; 20 MG/100ML; MG/100ML; MG/100ML; MG/100ML
INJECTION, SOLUTION INTRAVENOUS CONTINUOUS
Status: DISCONTINUED | OUTPATIENT
Start: 2025-02-17 | End: 2025-02-17 | Stop reason: HOSPADM

## 2025-02-17 RX ORDER — OXYCODONE HYDROCHLORIDE 5 MG/1
5 TABLET ORAL
Status: CANCELLED | OUTPATIENT
Start: 2025-02-17 | End: 2025-02-18

## 2025-02-17 RX ORDER — SODIUM CHLORIDE 9 MG/ML
INJECTION, SOLUTION INTRAVENOUS PRN
Status: CANCELLED | OUTPATIENT
Start: 2025-02-17

## 2025-02-17 RX ORDER — PROPOFOL 10 MG/ML
INJECTION, EMULSION INTRAVENOUS
Status: DISCONTINUED | OUTPATIENT
Start: 2025-02-17 | End: 2025-02-17 | Stop reason: SDUPTHER

## 2025-02-17 RX ORDER — HYDROMORPHONE HYDROCHLORIDE 1 MG/ML
0.5 INJECTION, SOLUTION INTRAMUSCULAR; INTRAVENOUS; SUBCUTANEOUS EVERY 5 MIN PRN
Status: CANCELLED | OUTPATIENT
Start: 2025-02-17

## 2025-02-17 RX ADMIN — PROPOFOL 50 MG: 10 INJECTION, EMULSION INTRAVENOUS at 07:46

## 2025-02-17 RX ADMIN — PROPOFOL 30 MG: 10 INJECTION, EMULSION INTRAVENOUS at 07:57

## 2025-02-17 RX ADMIN — PROPOFOL 30 MG: 10 INJECTION, EMULSION INTRAVENOUS at 07:55

## 2025-02-17 RX ADMIN — PROPOFOL 30 MG: 10 INJECTION, EMULSION INTRAVENOUS at 08:00

## 2025-02-17 RX ADMIN — PROPOFOL 30 MG: 10 INJECTION, EMULSION INTRAVENOUS at 08:05

## 2025-02-17 RX ADMIN — LIDOCAINE HYDROCHLORIDE 60 MG: 20 INJECTION, SOLUTION EPIDURAL; INFILTRATION; INTRACAUDAL; PERINEURAL at 07:46

## 2025-02-17 RX ADMIN — SODIUM CHLORIDE, POTASSIUM CHLORIDE, SODIUM LACTATE AND CALCIUM CHLORIDE: 600; 310; 30; 20 INJECTION, SOLUTION INTRAVENOUS at 07:05

## 2025-02-17 RX ADMIN — PROPOFOL 50 MG: 10 INJECTION, EMULSION INTRAVENOUS at 07:50

## 2025-02-17 RX ADMIN — PROPOFOL 30 MG: 10 INJECTION, EMULSION INTRAVENOUS at 08:02

## 2025-02-17 RX ADMIN — PROPOFOL 50 MG: 10 INJECTION, EMULSION INTRAVENOUS at 07:52

## 2025-02-17 RX ADMIN — PROPOFOL 30 MG: 10 INJECTION, EMULSION INTRAVENOUS at 08:11

## 2025-02-17 RX ADMIN — PROPOFOL 30 MG: 10 INJECTION, EMULSION INTRAVENOUS at 08:08

## 2025-02-17 RX ADMIN — PROPOFOL 50 MG: 10 INJECTION, EMULSION INTRAVENOUS at 07:48

## 2025-02-17 ASSESSMENT — ENCOUNTER SYMPTOMS
WHEEZING: 1
COLOR CHANGE: 0
CHEST TIGHTNESS: 0
DIARRHEA: 0
NAUSEA: 0
SORE THROAT: 0
SHORTNESS OF BREATH: 1
SINUS PRESSURE: 0
VOMITING: 0
BLOOD IN STOOL: 0
CONSTIPATION: 0
SINUS PAIN: 0
COUGH: 0

## 2025-02-17 ASSESSMENT — PAIN - FUNCTIONAL ASSESSMENT: PAIN_FUNCTIONAL_ASSESSMENT: 0-10

## 2025-02-17 NOTE — H&P
History and Physical Service   [facility]    HISTORY AND PHYSICAL EXAMINATION            Date of Evaluation: 2/17/2025  Patient name:  Venkat Vernon  MRN:   [id number]  YOB: 1965  PCP:    Tom Estrella APRN - CNP    History Obtained From:     Patient, medical records    History of Present Illness:     This is Venkat Vernon a 60 y.o. male who presents today for a COLORECTAL CANCER SCREENING, HIGH RISK by Oliva Perez MD for Encounter for screening for colorectal malignant neoplasm. Patient has known history of diverticulosis and history of polyp removal in previous colonoscopy. Patient denies bowel changes. He denies bloody tarry stools, diarrhea alternating with constipation, nausea, vomiting, abdominal pain or unintentional weight loss. Patient followed bowel prep until watery yellow color.  No previous EGD. FH colon cancer in father. Denies fever, chills, shortness of breath, cough, congestion, wheezing, chest pain, open sores or wounds. Denies hx of diabetes. Last Mobic more than a week ago, aspirin 02/15/2025.    Known hypertension. States occasional marijuana smoker.     Past Medical History:     Past Medical History:   Diagnosis Date    Diverticulosis     History of colonoscopy 01/29/2019    hyperplastic polyp, tubular adenoma    Hypertension     Inguinal hernia     right    Wears dentures     upper        Past Surgical History:     Past Surgical History:   Procedure Laterality Date    BACK SURGERY      COLONOSCOPY N/A 01/29/2019    hyperplastic polyp, tubular adenoma    HERNIA REPAIR Right 03/13/2019    HERNIA INGUINAL REPAIR WITH MESH performed by Bar Mabry MD at [facility] OR    MANDIBLE FRACTURE SURGERY      plate in chin    TONSILLECTOMY          Medications Prior to Admission:     Prior to Admission medications    Medication Sig Start Date End Date Taking? Authorizing Provider   meloxicam (MOBIC) 7.5 MG tablet Take 1 tablet by mouth daily as needed

## 2025-02-17 NOTE — ANESTHESIA POSTPROCEDURE EVALUATION
Department of Anesthesiology  Postprocedure Note    Patient: Venkat Vernon  MRN: [id number]  YOB: 1965  Date of evaluation: 2/17/2025    Procedure Summary       Date: 02/17/25 Room / Location: [address]    Anesthesia Start: 0744 Anesthesia Stop: 0824    Procedure: COLONOSCOPY POLYPECTOMY SNARE/BIOPSY TATTOO OF ASCENDING COLON POLYP, APPLICATION OF RESOLUTION CLIP X2 Diagnosis:       Encounter for screening for colorectal malignant neoplasm      (Encounter for screening for colorectal malignant neoplasm [Z12.11, Z12.12])    Surgeons: Oliva Perez MD Responsible Provider: Sienna Arguello MD    Anesthesia Type: MAC ASA Status: 2            Anesthesia Type: MAC    Reba Phase I: Reba Score: 10    Reba Phase II: Reba Score: 10    Anesthesia Post Evaluation    Patient location during evaluation: PACU  Patient participation: complete - patient participated  Level of consciousness: awake  Airway patency: patent  Nausea & Vomiting: no nausea  Cardiovascular status: blood pressure returned to baseline  Respiratory status: acceptable  Hydration status: euvolemic  Comments: Multimodal analgesia pain management as indicated by procedure  Multimodal analgesia pain management approach  Pain management: adequate    No notable events documented.

## 2025-02-17 NOTE — ANESTHESIA PRE PROCEDURE
Signs (Current):   Vitals:    02/17/25 0643 02/17/25 0656   BP: (!) 143/106    Pulse: 54    Resp: 15    Temp: 97.5 °F (36.4 °C)    SpO2: 96%    Weight:  106.2 kg (234 lb 1.6 oz)   Height:  1.88 m (6' 2\")                                              BP Readings from Last 3 Encounters:   02/17/25 (!) 143/106   01/03/25 136/86   06/20/24 (!) 152/91       NPO Status: Time of last liquid consumption: 2000                        Time of last solid consumption: 0730                        Date of last liquid consumption: 02/16/25                        Date of last solid food consumption: 02/16/25 (toast)    BMI:   Wt Readings from Last 3 Encounters:   02/17/25 106.2 kg (234 lb 1.6 oz)   01/03/25 107.5 kg (237 lb)   06/20/24 102.1 kg (225 lb)     Body mass index is 30.06 kg/m².    CBC:   Lab Results   Component Value Date/Time    WBC 9.1 06/20/2024 09:13 AM    RBC 4.56 06/20/2024 09:13 AM    HGB 14.7 06/20/2024 09:13 AM    HCT 42.5 06/20/2024 09:13 AM    MCV 93.2 06/20/2024 09:13 AM    RDW 11.9 06/20/2024 09:13 AM     06/20/2024 09:13 AM       CMP:   Lab Results   Component Value Date/Time     06/20/2024 09:13 AM    K 4.1 06/20/2024 09:13 AM     06/20/2024 09:13 AM    CO2 25 06/20/2024 09:13 AM    BUN 24 06/20/2024 09:13 AM    CREATININE 0.8 06/20/2024 09:13 AM    GFRAA >60 09/21/2022 04:26 PM    LABGLOM >90 06/20/2024 09:13 AM    LABGLOM >60 01/03/2024 08:35 AM    GLUCOSE 124 06/20/2024 09:13 AM    CALCIUM 9.1 06/20/2024 09:13 AM    BILITOT 0.4 06/20/2024 09:13 AM    ALKPHOS 55 06/20/2024 09:13 AM    AST 19 06/20/2024 09:13 AM    ALT 23 06/20/2024 09:13 AM       POC Tests: No results for input(s): \"POCGLU\", \"POCNA\", \"POCK\", \"POCCL\", \"POCBUN\", \"POCHEMO\", \"POCHCT\" in the last 72 hours.    Coags:   Lab Results   Component Value Date/Time    PROTIME 10.7 08/01/2012 12:10 PM    INR 1.0 08/01/2012 12:10 PM    APTT 27.8 08/01/2012 12:10 PM       HCG (If Applicable): No results found for: \"PREGTESTUR\",

## 2025-02-17 NOTE — TELEPHONE ENCOUNTER
----- Message from Dr. Oliva Perez MD sent at 2/17/2025  8:20 AM EST -----  Colonoscopy completed and EMR performed. Prep was poor. Needs repeat colonoscopy in 6 months with 2 day prep.

## 2025-02-17 NOTE — OP NOTE
Colonoscopy report    Colonoscopy Procedure Note    Procedure:  Colonoscopy with polypectomy with biopsy, endoscopic mucosal rest    Procedure Date: 2/17/2025    Indications: History of colon polyps, family history of colon cancer    Sedation:  MAC    Attending Physician:  Dr. Oliva Perez MD.     Assistant Physician: None    Consent:   Informed consent was obtained for the procedure after explaining the risks including bleeding, perforation, aspiration, arrhythmia, risks related to sedation, reaction to medications and rarely death, benefits and alternatives to the patient. The patient verbalized understanding and agreed to proceed with the procedure.       Procedure Details:  The patient was placed in the left lateral decubitus position.  Oxygen and cardiac monitoring equipment was attached. The patient's vital signs were monitored continuously  throughout the procedure. After appropriate sedation was achieved, a rectal examination was performed.  The pediatric colonoscope was inserted into the rectum and advanced under direct vision to the cecum, which was identified by the ileocecal valve and appendiceal orifice.  The quality of the colonic preparation was poor.  A careful inspection was made as the colonoscope was withdrawn, including a retroflexed view of the rectum; findings and interventions are described below.  Appropriate photodocumentation was obtained.           Complications:  None    Estimated blood loss:  Minimal           Disposition:  Home           Condition: stable    Withdrawal Time: 19 minutes    Findings:   The bowel prep was fair to poor.  Multiple small and large moderate diverticula noted in the sigmoid and descending colon.  2 mm polyp noted in the cecum that was resected with large biopsy forceps.  A flat 1.5 cm polyp noted in the ascending colon.  Preparations were made for endoscopic mucosal resection.  The base of the polyp was injected with Eleview and adequate lift was achieved.

## 2025-02-18 LAB — SURGICAL PATHOLOGY REPORT: NORMAL

## 2025-02-19 ENCOUNTER — TELEPHONE (OUTPATIENT)
Dept: GASTROENTEROLOGY | Age: 60
End: 2025-02-19

## 2025-02-19 NOTE — TELEPHONE ENCOUNTER
Called & spoke w/ the patient - scheduled procedure follow up appointment w/ Dr. Perez for 2/25.    Message  Received: Today  Oliva Perez, Luzmaria Ji  Need follow-up in clinic.

## 2025-02-24 NOTE — TELEPHONE ENCOUNTER
Writer called pt, no answer. Left voicemail message to call office back to schedule repeat colonoscopy in 6 months (due in August).

## 2025-02-25 ENCOUNTER — OFFICE VISIT (OUTPATIENT)
Dept: GASTROENTEROLOGY | Age: 60
End: 2025-02-25
Payer: COMMERCIAL

## 2025-02-25 VITALS
TEMPERATURE: 97.9 F | SYSTOLIC BLOOD PRESSURE: 159 MMHG | HEIGHT: 74 IN | BODY MASS INDEX: 28.88 KG/M2 | WEIGHT: 225 LBS | RESPIRATION RATE: 20 BRPM | DIASTOLIC BLOOD PRESSURE: 77 MMHG | HEART RATE: 66 BPM

## 2025-02-25 DIAGNOSIS — Z80.0 FAMILY HISTORY OF COLON CANCER: ICD-10-CM

## 2025-02-25 DIAGNOSIS — K57.90 DIVERTICULOSIS: ICD-10-CM

## 2025-02-25 DIAGNOSIS — Z86.0100 HISTORY OF COLON POLYPS: Primary | ICD-10-CM

## 2025-02-25 PROCEDURE — G2211 COMPLEX E/M VISIT ADD ON: HCPCS | Performed by: INTERNAL MEDICINE

## 2025-02-25 PROCEDURE — 99214 OFFICE O/P EST MOD 30 MIN: CPT | Performed by: INTERNAL MEDICINE

## 2025-02-25 PROCEDURE — 3077F SYST BP >= 140 MM HG: CPT | Performed by: INTERNAL MEDICINE

## 2025-02-25 PROCEDURE — 3078F DIAST BP <80 MM HG: CPT | Performed by: INTERNAL MEDICINE

## 2025-02-25 ASSESSMENT — ENCOUNTER SYMPTOMS
ABDOMINAL DISTENTION: 0
WHEEZING: 0
COLOR CHANGE: 0
VOMITING: 0
DIARRHEA: 0
NAUSEA: 0
COUGH: 0
BLOOD IN STOOL: 0
RECTAL PAIN: 0
SORE THROAT: 0
ABDOMINAL PAIN: 0
CONSTIPATION: 0
ANAL BLEEDING: 0
TROUBLE SWALLOWING: 0
CHOKING: 0
VOICE CHANGE: 0

## 2025-02-25 NOTE — PROGRESS NOTES
Reason for Referral:       No referring provider defined for this encounter.    Chief Complaint   Patient presents with    Colonoscopy    Follow Up After Procedure     Hx Diverticulosis           HISTORY OF PRESENT ILLNESS: Mr.Michael PASCUAL Vernon is a 60 y.o. male with a past history remarkable for diverticulosis,, referred for evaluation after having a screening colonoscopy.  The patient has a history significant for family history of colon cancer.  He also has history of colon polyps.  His colonoscopy was 2/17/2025 and noted to have 2 polyps.  One of them was large and removed with EMR technique.  Biopsy consistent with sessile serrated lesion.  No new symptoms reported today.      Previous Endoscopies    Colonoscopy 2/17/2025:  Endoscopic Impression:    Fair to poor bowel prep.  Diverticulosis.  2 mm cecal polyp resected with large biopsy forceps cold.  1.5 cm flat ascending colon polyp resected with EMR technique en bloc.  Margins ablated with soft tissue coag using snare tip.  2 clips to close the defect and tattoo placed.  -- Diagnosis --   A. CECAL POLYP, BIOPSY:  Benign mucosal polyp.      B. ASCENDING COLON POLYP, BIOPSY:  Sessile serrated lesion with   underlying submucosal lipoma.   Previous GI workup       Past Medical,Family, and Social History reviewed and does not contribute to the patient presentingcondition.    Patient's PMH/PSH,SH,PSYCH Hx, MEDs, ALLERGIES, and ROS were all reviewed and updated in the appropriate sections.    PAST MEDICAL HISTORY:  Past Medical History:   Diagnosis Date    Diverticulosis     History of colonoscopy 01/29/2019    hyperplastic polyp, tubular adenoma    Hypertension     Inguinal hernia     right    Wears dentures     upper       Past Surgical History:   Procedure Laterality Date    BACK SURGERY      COLONOSCOPY N/A 01/29/2019    hyperplastic polyp, tubular adenoma    COLONOSCOPY N/A 2/17/2025    COLONOSCOPY POLYPECTOMY SNARE/BIOPSY TATTOO OF ASCENDING COLON POLYP,

## 2025-03-24 ENCOUNTER — TELEPHONE (OUTPATIENT)
Dept: GASTROENTEROLOGY | Age: 60
End: 2025-03-24

## 2025-03-24 NOTE — TELEPHONE ENCOUNTER
Patient called to schedule colonoscopy.  He stated that you would call him in a week and has not heard from you since his visit in February. He has off the first week in August and was hoping to get it scheduled 8/11 if possible.

## 2025-03-24 NOTE — TELEPHONE ENCOUNTER
There is already a telephone encounter regarding pt's repeat colonoscopy. Please see telephone encounter from 02/17/25 with Dr. Perez, regarding \"Repeat Colonoscopy/Ana\". Pt is due around August, 6 months from last procedure. I contacted pt on 02/24/25 and left a voicemail requesting for pt to call office back to schedule procedure. Pt never returned call until today. We do not have the August call schedule available yet for the hospital. Unable to schedule until the call schedule is available. Pt was told in office on 02/25/25, that he would receive a call once the call schedule is available.

## 2025-04-28 ENCOUNTER — PREP FOR PROCEDURE (OUTPATIENT)
Dept: GASTROENTEROLOGY | Age: 60
End: 2025-04-28

## 2025-04-28 ENCOUNTER — TELEPHONE (OUTPATIENT)
Dept: GASTROENTEROLOGY | Age: 60
End: 2025-04-28

## 2025-04-28 DIAGNOSIS — Z86.0100 HISTORY OF COLON POLYPS: ICD-10-CM

## 2025-04-28 DIAGNOSIS — Z80.0 FAMILY HISTORY OF COLON CANCER: ICD-10-CM

## 2025-04-28 NOTE — TELEPHONE ENCOUNTER
Called Pt from . Pt states he will like to reschedule his Colonoscopy, and office f/u appt Please. Pt states Mondays will be better for him, due to Work.    Thank You.

## 2025-06-18 DIAGNOSIS — E03.9 HYPOTHYROIDISM, UNSPECIFIED TYPE: ICD-10-CM

## 2025-06-18 RX ORDER — LEVOTHYROXINE SODIUM 25 UG/1
25 TABLET ORAL DAILY
Qty: 90 TABLET | Refills: 1 | Status: SHIPPED | OUTPATIENT
Start: 2025-06-18

## 2025-06-18 NOTE — TELEPHONE ENCOUNTER
Last visit: 1/3/25  Last Med refill: 12/16/24  Does patient have enough medication for 72 hours: No:     Next Visit Date:  Future Appointments   Date Time Provider Department Center   7/7/2025  8:30 AM Tom Estrella, APRN - CNP [facility]       Health Maintenance   Topic Date Due    Hepatitis C screen  Never done    Hepatitis B vaccine (1 of 3 - 19+ 3-dose series) Never done    Shingles vaccine (1 of 2) Never done    Pneumococcal 50+ years Vaccine (2 of 2 - PCV) 01/31/2020    Lipids  01/03/2025    Flu vaccine (Season Ended) 01/03/2026 (Originally 8/1/2025)    Diabetes screen  11/18/2025    Depression Screen  01/03/2026    DTaP/Tdap/Td vaccine (2 - Td or Tdap) 09/25/2027    Colorectal Cancer Screen  02/17/2030    COVID-19 Vaccine  Completed    Hepatitis A vaccine  Aged Out    Hib vaccine  Aged Out    Polio vaccine  Aged Out    Meningococcal (ACWY) vaccine  Aged Out    Meningococcal B vaccine  Aged Out    Pneumococcal 0-49 years Vaccine  Discontinued    HIV screen  Discontinued    Prostate Specific Antigen (PSA) Screening or Monitoring  Discontinued       Hemoglobin A1C (%)   Date Value   11/18/2022 5.1   03/07/2019 5.5             ( goal A1C is < 7)   No components found for: \"LABMICR\"  No components found for: \"LDLCHOLESTEROL\", \"LDLCALC\"    (goal LDL is <100)   AST (U/L)   Date Value   06/20/2024 19     ALT (U/L)   Date Value   06/20/2024 23     BUN (mg/dL)   Date Value   06/20/2024 24 (H)     BP Readings from Last 3 Encounters:   02/25/25 (!) 159/77   02/17/25 (!) 143/82   01/03/25 136/86          (goal 120/80)    All Future Testing planned in [facility]  Lab Frequency Next Occurrence   COLONOSCOPY (Screening) Once 02/24/2025   COLONOSCOPY (Screening) Once 03/04/2025               Patient Active Problem List:     Essential hypertension     Class 1 obesity due to excess calories with serious comorbidity and body mass index (BMI) of 30.0 to 30.9 in adult     Diverticulosis of large intestine

## 2025-07-07 ENCOUNTER — OFFICE VISIT (OUTPATIENT)
Dept: FAMILY MEDICINE CLINIC | Age: 60
End: 2025-07-07
Payer: COMMERCIAL

## 2025-07-07 ENCOUNTER — HOSPITAL ENCOUNTER (OUTPATIENT)
Age: 60
Setting detail: SPECIMEN
Discharge: HOME | End: 2025-07-07

## 2025-07-07 VITALS
BODY MASS INDEX: 30.54 KG/M2 | SYSTOLIC BLOOD PRESSURE: 154 MMHG | DIASTOLIC BLOOD PRESSURE: 90 MMHG | HEART RATE: 61 BPM | WEIGHT: 238 LBS | OXYGEN SATURATION: 98 %

## 2025-07-07 DIAGNOSIS — I10 ESSENTIAL HYPERTENSION: ICD-10-CM

## 2025-07-07 DIAGNOSIS — I10 ESSENTIAL HYPERTENSION: Primary | ICD-10-CM

## 2025-07-07 DIAGNOSIS — M54.31 SCIATICA, RIGHT SIDE: ICD-10-CM

## 2025-07-07 DIAGNOSIS — E78.2 MIXED HYPERLIPIDEMIA: ICD-10-CM

## 2025-07-07 DIAGNOSIS — E03.9 HYPOTHYROIDISM, UNSPECIFIED TYPE: ICD-10-CM

## 2025-07-07 LAB
ALBUMIN SERPL-MCNC: 4.3 G/DL (ref 3.5–5.2)
ALBUMIN/GLOB SERPL: 1.7 {RATIO} (ref 1–2.5)
ALP SERPL-CCNC: 45 U/L (ref 40–129)
ALT SERPL-CCNC: 28 U/L (ref 10–50)
ANION GAP SERPL CALCULATED.3IONS-SCNC: 10 MMOL/L (ref 9–16)
AST SERPL-CCNC: 27 U/L (ref 10–50)
BILIRUB SERPL-MCNC: 0.4 MG/DL (ref 0–1.2)
BUN SERPL-MCNC: 21 MG/DL (ref 6–20)
CALCIUM SERPL-MCNC: 9 MG/DL (ref 8.6–10.4)
CHLORIDE SERPL-SCNC: 105 MMOL/L (ref 98–107)
CHOLEST SERPL-MCNC: 146 MG/DL (ref 0–199)
CHOLESTEROL/HDL RATIO: 2.5
CO2 SERPL-SCNC: 24 MMOL/L (ref 20–31)
CREAT SERPL-MCNC: 0.8 MG/DL (ref 0.7–1.2)
ERYTHROCYTE [DISTWIDTH] IN BLOOD BY AUTOMATED COUNT: 12.1 % (ref 11.8–14.4)
GFR, ESTIMATED: >90 ML/MIN/1.73M2
GLUCOSE SERPL-MCNC: 92 MG/DL (ref 74–99)
HCT VFR BLD AUTO: 38.9 % (ref 40.7–50.3)
HDLC SERPL-MCNC: 58 MG/DL
HGB BLD-MCNC: 13.7 G/DL (ref 13–17)
LDLC SERPL CALC-MCNC: 76 MG/DL (ref 0–100)
MCH RBC QN AUTO: 32.8 PG (ref 25.2–33.5)
MCHC RBC AUTO-ENTMCNC: 35.2 G/DL (ref 28.4–34.8)
MCV RBC AUTO: 93.1 FL (ref 82.6–102.9)
NRBC BLD-RTO: 0 PER 100 WBC
PLATELET # BLD AUTO: 178 K/UL (ref 138–453)
PMV BLD AUTO: 10.2 FL (ref 8.1–13.5)
POTASSIUM SERPL-SCNC: 4.6 MMOL/L (ref 3.7–5.3)
PROT SERPL-MCNC: 6.8 G/DL (ref 6.6–8.7)
RBC # BLD AUTO: 4.18 M/UL (ref 4.21–5.77)
SODIUM SERPL-SCNC: 139 MMOL/L (ref 136–145)
TRIGL SERPL-MCNC: 59 MG/DL (ref 0–149)
TSH SERPL DL<=0.05 MIU/L-ACNC: 4.01 UIU/ML (ref 0.27–4.2)
VLDLC SERPL CALC-MCNC: 12 MG/DL (ref 1–30)
WBC OTHER # BLD: 7 K/UL (ref 3.5–11.3)

## 2025-07-07 PROCEDURE — 3077F SYST BP >= 140 MM HG: CPT | Performed by: NURSE PRACTITIONER

## 2025-07-07 PROCEDURE — 3080F DIAST BP >= 90 MM HG: CPT | Performed by: NURSE PRACTITIONER

## 2025-07-07 PROCEDURE — 99214 OFFICE O/P EST MOD 30 MIN: CPT | Performed by: NURSE PRACTITIONER

## 2025-07-07 RX ORDER — LISINOPRIL 5 MG/1
5 TABLET ORAL DAILY
Qty: 90 TABLET | Refills: 1 | Status: SHIPPED | OUTPATIENT
Start: 2025-07-07 | End: 2026-01-03

## 2025-07-07 RX ORDER — MELOXICAM 7.5 MG/1
7.5 TABLET ORAL DAILY PRN
Qty: 90 TABLET | Refills: 1 | Status: SHIPPED | OUTPATIENT
Start: 2025-07-07

## 2025-07-07 RX ORDER — ROSUVASTATIN CALCIUM 5 MG/1
5 TABLET, COATED ORAL DAILY
Qty: 90 TABLET | Refills: 1 | Status: SHIPPED | OUTPATIENT
Start: 2025-07-07

## 2025-07-07 RX ORDER — HYDROCHLOROTHIAZIDE 12.5 MG/1
12.5 TABLET ORAL DAILY
Qty: 90 TABLET | Refills: 1 | Status: SHIPPED | OUTPATIENT
Start: 2025-07-07

## 2025-07-07 SDOH — ECONOMIC STABILITY: FOOD INSECURITY: WITHIN THE PAST 12 MONTHS, THE FOOD YOU BOUGHT JUST DIDN'T LAST AND YOU DIDN'T HAVE MONEY TO GET MORE.: NEVER TRUE

## 2025-07-07 SDOH — ECONOMIC STABILITY: FOOD INSECURITY: WITHIN THE PAST 12 MONTHS, YOU WORRIED THAT YOUR FOOD WOULD RUN OUT BEFORE YOU GOT MONEY TO BUY MORE.: NEVER TRUE

## 2025-07-07 ASSESSMENT — ENCOUNTER SYMPTOMS
COUGH: 0
SHORTNESS OF BREATH: 0

## 2025-07-07 NOTE — ASSESSMENT & PLAN NOTE
Pt states diet and activities was not his normal all weekend, will rto in 1-2 weeks for bp check.     Orders:    CBC; Future    Comprehensive Metabolic Panel; Future    hydroCHLOROthiazide 12.5 MG tablet; Take 1 tablet by mouth daily    lisinopril (PRINIVIL;ZESTRIL) 5 MG tablet; Take 1 tablet by mouth daily

## 2025-07-07 NOTE — PROGRESS NOTES
An electronic signature was used to authenticate this note.    --ANA LILIA MACE, PREMA - CNP

## 2025-07-08 ENCOUNTER — RESULTS FOLLOW-UP (OUTPATIENT)
Dept: FAMILY MEDICINE CLINIC | Age: 60
End: 2025-07-08

## 2025-07-23 DIAGNOSIS — I10 ESSENTIAL HYPERTENSION: ICD-10-CM

## 2025-07-23 RX ORDER — LISINOPRIL 5 MG/1
5 TABLET ORAL DAILY
Qty: 90 TABLET | Refills: 1 | OUTPATIENT
Start: 2025-07-23 | End: 2026-01-19

## 2025-08-15 ENCOUNTER — PATIENT MESSAGE (OUTPATIENT)
Dept: GASTROENTEROLOGY | Age: 60
End: 2025-08-15

## 2025-08-18 ENCOUNTER — ANESTHESIA (OUTPATIENT)
Dept: OPERATING ROOM | Age: 60
End: 2025-08-18
Payer: COMMERCIAL

## 2025-08-18 ENCOUNTER — ANESTHESIA EVENT (OUTPATIENT)
Dept: OPERATING ROOM | Age: 60
End: 2025-08-18
Payer: COMMERCIAL

## 2025-08-18 ENCOUNTER — HOSPITAL ENCOUNTER (OUTPATIENT)
Age: 60
Setting detail: OUTPATIENT SURGERY
Discharge: HOME | End: 2025-08-18
Attending: INTERNAL MEDICINE | Admitting: INTERNAL MEDICINE
Payer: COMMERCIAL

## 2025-08-18 VITALS
WEIGHT: 230 LBS | BODY MASS INDEX: 29.52 KG/M2 | HEIGHT: 74 IN | HEART RATE: 47 BPM | OXYGEN SATURATION: 97 % | DIASTOLIC BLOOD PRESSURE: 82 MMHG | TEMPERATURE: 97 F | SYSTOLIC BLOOD PRESSURE: 123 MMHG | RESPIRATION RATE: 14 BRPM

## 2025-08-18 DIAGNOSIS — Z86.0100 HISTORY OF COLON POLYPS: ICD-10-CM

## 2025-08-18 DIAGNOSIS — Z80.0 FAMILY HISTORY OF COLON CANCER: ICD-10-CM

## 2025-08-18 PROCEDURE — 3700000000 HC ANESTHESIA ATTENDED CARE: Performed by: INTERNAL MEDICINE

## 2025-08-18 PROCEDURE — 3609010300 HC COLONOSCOPY W/BIOPSY SINGLE/MULTIPLE: Performed by: INTERNAL MEDICINE

## 2025-08-18 PROCEDURE — 7100000011 HC PHASE II RECOVERY - ADDTL 15 MIN: Performed by: INTERNAL MEDICINE

## 2025-08-18 PROCEDURE — 7100000010 HC PHASE II RECOVERY - FIRST 15 MIN: Performed by: INTERNAL MEDICINE

## 2025-08-18 PROCEDURE — 2709999900 HC NON-CHARGEABLE SUPPLY: Performed by: INTERNAL MEDICINE

## 2025-08-18 PROCEDURE — 6360000002 HC RX W HCPCS: Performed by: SPECIALIST

## 2025-08-18 PROCEDURE — 3700000001 HC ADD 15 MINUTES (ANESTHESIA): Performed by: INTERNAL MEDICINE

## 2025-08-18 PROCEDURE — 88305 TISSUE EXAM BY PATHOLOGIST: CPT

## 2025-08-18 PROCEDURE — 2580000003 HC RX 258: Performed by: ANESTHESIOLOGY

## 2025-08-18 RX ORDER — LIDOCAINE HYDROCHLORIDE 10 MG/ML
1 INJECTION, SOLUTION EPIDURAL; INFILTRATION; INTRACAUDAL; PERINEURAL
Status: DISCONTINUED | OUTPATIENT
Start: 2025-08-19 | End: 2025-08-18 | Stop reason: HOSPADM

## 2025-08-18 RX ORDER — PROPOFOL 10 MG/ML
INJECTION, EMULSION INTRAVENOUS
Status: DISCONTINUED | OUTPATIENT
Start: 2025-08-18 | End: 2025-08-18 | Stop reason: SDUPTHER

## 2025-08-18 RX ORDER — SODIUM CHLORIDE, SODIUM LACTATE, POTASSIUM CHLORIDE, CALCIUM CHLORIDE 600; 310; 30; 20 MG/100ML; MG/100ML; MG/100ML; MG/100ML
INJECTION, SOLUTION INTRAVENOUS CONTINUOUS
Status: DISCONTINUED | OUTPATIENT
Start: 2025-08-18 | End: 2025-08-18 | Stop reason: HOSPADM

## 2025-08-18 RX ORDER — SODIUM CHLORIDE 0.9 % (FLUSH) 0.9 %
5-40 SYRINGE (ML) INJECTION EVERY 12 HOURS SCHEDULED
Status: DISCONTINUED | OUTPATIENT
Start: 2025-08-18 | End: 2025-08-18 | Stop reason: HOSPADM

## 2025-08-18 RX ORDER — SODIUM CHLORIDE 9 MG/ML
INJECTION, SOLUTION INTRAVENOUS CONTINUOUS
Status: DISCONTINUED | OUTPATIENT
Start: 2025-08-18 | End: 2025-08-18 | Stop reason: HOSPADM

## 2025-08-18 RX ORDER — SODIUM CHLORIDE 9 MG/ML
INJECTION, SOLUTION INTRAVENOUS PRN
Status: DISCONTINUED | OUTPATIENT
Start: 2025-08-18 | End: 2025-08-18 | Stop reason: HOSPADM

## 2025-08-18 RX ORDER — SODIUM CHLORIDE 0.9 % (FLUSH) 0.9 %
5-40 SYRINGE (ML) INJECTION PRN
Status: DISCONTINUED | OUTPATIENT
Start: 2025-08-18 | End: 2025-08-18 | Stop reason: HOSPADM

## 2025-08-18 RX ORDER — LIDOCAINE HYDROCHLORIDE 20 MG/ML
INJECTION, SOLUTION EPIDURAL; INFILTRATION; INTRACAUDAL; PERINEURAL
Status: DISCONTINUED | OUTPATIENT
Start: 2025-08-18 | End: 2025-08-18 | Stop reason: SDUPTHER

## 2025-08-18 RX ADMIN — PROPOFOL 50 MG: 10 INJECTION, EMULSION INTRAVENOUS at 08:40

## 2025-08-18 RX ADMIN — LIDOCAINE HYDROCHLORIDE 100 MG: 20 INJECTION, SOLUTION EPIDURAL; INFILTRATION; INTRACAUDAL; PERINEURAL at 08:22

## 2025-08-18 RX ADMIN — PROPOFOL 50 MG: 10 INJECTION, EMULSION INTRAVENOUS at 08:30

## 2025-08-18 RX ADMIN — PROPOFOL 50 MG: 10 INJECTION, EMULSION INTRAVENOUS at 08:33

## 2025-08-18 RX ADMIN — PROPOFOL 50 MG: 10 INJECTION, EMULSION INTRAVENOUS at 08:37

## 2025-08-18 RX ADMIN — SODIUM CHLORIDE, SODIUM LACTATE, POTASSIUM CHLORIDE, AND CALCIUM CHLORIDE: .6; .31; .03; .02 INJECTION, SOLUTION INTRAVENOUS at 07:11

## 2025-08-18 RX ADMIN — PROPOFOL 50 MG: 10 INJECTION, EMULSION INTRAVENOUS at 08:28

## 2025-08-18 RX ADMIN — PROPOFOL 150 MG: 10 INJECTION, EMULSION INTRAVENOUS at 08:22

## 2025-08-18 ASSESSMENT — ENCOUNTER SYMPTOMS
SORE THROAT: 0
EYES NEGATIVE: 1
SHORTNESS OF BREATH: 0
COUGH: 0
CHEST TIGHTNESS: 0
RHINORRHEA: 0
WHEEZING: 0

## 2025-08-18 ASSESSMENT — PAIN SCALES - GENERAL
PAINLEVEL_OUTOF10: 0

## 2025-08-18 ASSESSMENT — PAIN - FUNCTIONAL ASSESSMENT: PAIN_FUNCTIONAL_ASSESSMENT: 0-10

## 2025-08-20 LAB — SURGICAL PATHOLOGY REPORT: NORMAL

## (undated) DEVICE — FORCEPS BX 240CM 2.4MM L NDL RAD JAW 4 M00513334

## (undated) DEVICE — Z DISCONTINUED BY MEDLINE USE 2711682 TRAY SKIN PREP DRY W/ PREM GLV

## (undated) DEVICE — HYPODERMIC SAFETY NEEDLE: Brand: MAGELLAN

## (undated) DEVICE — MANIFOLD SUCT 4 PRT 2 CANSTR FLTR DISP NEPTUNE 2

## (undated) DEVICE — SUTURE ETHBND SZ 2-0 L18IN NONABSORBABLE GRN L26MM CT-2 1/2 CX26D

## (undated) DEVICE — ENDOSCOPIC KIT 1.1+ 10 FT DE 2 GWN AAMI LEVEL 3

## (undated) DEVICE — UNDERPAD HOSP W30XL36IN GRN POLYPR HI ABSRB DISP

## (undated) DEVICE — STAZ ENDO KIT: Brand: MEDLINE INDUSTRIES, INC.

## (undated) DEVICE — CLIP
Type: IMPLANTABLE DEVICE | Site: ASCENDING COLON | Status: NON-FUNCTIONAL
Brand: RESOLUTION 360™ ULTRA CLIP

## (undated) DEVICE — COVER,MAYO STAND,STERILE: Brand: MEDLINE

## (undated) DEVICE — CUP MED 60ML 2OZ CLR GRAD DISP PLAS NO LID

## (undated) DEVICE — Z DISCONTINUED GLOVE SURG SZ 7.5 L12IN FNGR THK13MIL WHT ISOLEX

## (undated) DEVICE — Z DISCONTINUED USE 2424143 ADAPTER O2 SWVL CHRISTMAS TREE GRN

## (undated) DEVICE — Z INACTIVE USE 2525529 CONNECTOR TBNG FOR O2

## (undated) DEVICE — SUTURE VCRL + SZ 3-0 L27IN ABSRB WHT CT-1 1/2 CIR VCP258H

## (undated) DEVICE — DEFENDO AIR WATER SUCTION AND BIOPSY VALVE KIT FOR  OLYMPUS: Brand: DEFENDO AIR/WATER/SUCTION AND BIOPSY VALVE

## (undated) DEVICE — JELLY,LUBE,STERILE,FLIP TOP,TUBE,2-OZ: Brand: MEDLINE

## (undated) DEVICE — SEALER LAP SM L18.8CM OPN JAW HAND/FOOT SWCH FORCETRIAD

## (undated) DEVICE — Device: Brand: SPOT EX ENDOSCOPIC TATTOO

## (undated) DEVICE — GAUZE,SPONGE,4"X4",16PLY,XRAY,STRL,LF: Brand: MEDLINE

## (undated) DEVICE — NEEDLE INJ 25GA P5MM SHFT L230CM SHTH DIA2.5MM S STL TEF

## (undated) DEVICE — SUTURE VCRL + SZ 2-0 L27IN ABSRB WHT SH 1/2 CIR TAPERCUT VCP417H

## (undated) DEVICE — SHEET, T, LAPAROTOMY, STERILE: Brand: MEDLINE

## (undated) DEVICE — 3M™ WARMING BLANKET, UPPER BODY, 10 PER CASE, 42268: Brand: BAIR HUGGER™

## (undated) DEVICE — FORCEPS BX L240CM JAW DIA2.4MM ORNG L CAP W/ NDL DISP RAD

## (undated) DEVICE — SOLUTION IV IRRIG POUR BRL 0.9% SODIUM CHL 2F7124

## (undated) DEVICE — GOWN,AURORA,NONREINFORCED,LARGE: Brand: MEDLINE

## (undated) DEVICE — STRIP,CLOSURE,WOUND,MEDI-STRIP,1/2X4: Brand: MEDLINE

## (undated) DEVICE — SYRINGE MED 50ML LUERSLIP TIP

## (undated) DEVICE — PENROSE DRAIN 18 X .5" SILICONE: Brand: MEDLINE

## (undated) DEVICE — SNARE ENDOSCP XL W33MMXL240CM SHTH DIA2.4MM COLON STIFF

## (undated) DEVICE — SUTURE VCRL + SZ 3 0 L54IN ABSRB UD POLYGLACTIN 910 W VCP285G

## (undated) DEVICE — SOLUTION SURG PREP ANTIMICROBIAL 4 OZ SKIN WND EXIDINE

## (undated) DEVICE — DRESSING TRNSPAR W5XL4.5IN FLM SHT SEMIPERMEABLE WIND

## (undated) DEVICE — SINGLE PORT MANIFOLD: Brand: NEPTUNE 2

## (undated) DEVICE — STRAP,POSITIONING,KNEE/BODY,FOAM,4X60": Brand: MEDLINE

## (undated) DEVICE — ST CHARLES MINOR ABDOMINAL PK: Brand: MEDLINE INDUSTRIES, INC.

## (undated) DEVICE — Z DUPLICATE USE 2527422 TUBING O2 STD 7 FT EXTN NO CRUSH VISUAL CNTRST LF

## (undated) DEVICE — CANNULA NSL AD TBNG L7FT PVC STR NONFLARED PRNG O2 DEL W STD

## (undated) DEVICE — ERBE NESSY® OMEGA PLATE USA (85+23)CM² , WITH CABLE 3 M: Brand: ERBE

## (undated) DEVICE — SNARE ENDOSCP L240CM OD24MM LOOP W15MM RND INSUL STIFF BRAID

## (undated) DEVICE — GLOVE ORANGE PI 7   MSG9070

## (undated) DEVICE — GOWN,POLY REINFORCED,LG: Brand: MEDLINE

## (undated) DEVICE — PAD,NON-ADHERENT,3X8,STERILE,LF,1/PK: Brand: MEDLINE

## (undated) DEVICE — 3M™ STERI-STRIP™ COMPOUND BENZOIN TINCTURE 40 BAGS/CARTON 4 CARTONS/CASE C1544: Brand: 3M™ STERI-STRIP™